# Patient Record
Sex: MALE | Race: WHITE | HISPANIC OR LATINO | Employment: FULL TIME | URBAN - METROPOLITAN AREA
[De-identification: names, ages, dates, MRNs, and addresses within clinical notes are randomized per-mention and may not be internally consistent; named-entity substitution may affect disease eponyms.]

---

## 2021-02-17 ENCOUNTER — TELEPHONE (OUTPATIENT)
Dept: SCHEDULING | Facility: IMAGING CENTER | Age: 36
End: 2021-02-17

## 2021-03-09 ENCOUNTER — PATIENT OUTREACH (OUTPATIENT)
Dept: MEDICAL GROUP | Facility: MEDICAL CENTER | Age: 36
End: 2021-03-09

## 2021-03-09 NOTE — PROGRESS NOTES
NEW PATIENT VISIT PRE-VISIT PLANNING    1.  Epic[employer] Patient is checked in Patient Demographics?Yes    2.  Immunizations were updated in Epic using Reconcile Outside Information activity? Yes         3.  Is this appointment scheduled as a Hospital Follow-Up? No    4.  Patient is due for the following Health Maintenance Topics:   Health Maintenance Due   Topic Date Due   • IMM DTaP/Tdap/Td Vaccine (1 - Tdap) Never done   • IMM INFLUENZA (1) Never done     5.  Reviewed/Updated the following with patient:       •   Preferred Pharmacy? Yes       •   Preferred Lab? Yes       •   Preferred Communication? Yes       •   Allergies? Yes       •   Medications? YES. Was Abstract Encounter opened and chart updated? YES       •   Social History? Yes       •   Family History (document living status of immediate family members and if + hx of  cancer, diabetes, hypertension, hyperlipidemia, heart attack, stroke) No    6.  Updated Care Team?       •   DME Company (gait device, O2, CPAP, etc.) N\A       •   Other Specialists (eye doctor, derm, GYN, cardiology, endo, etc): N\A    7.  AHA (Puls8) form printed for Provider? N/A         Outside information NOT reconciled using the dlo[place] feature. Per Julissa Arreguin, the dlo[place] feature is down as of 02/09/2021 at 2:00pm. Will reconcile outside information at a later date.

## 2021-03-15 ENCOUNTER — TELEPHONE (OUTPATIENT)
Dept: SCHEDULING | Facility: IMAGING CENTER | Age: 36
End: 2021-03-15

## 2021-04-05 SDOH — ECONOMIC STABILITY: HOUSING INSECURITY
IN THE LAST 12 MONTHS, WAS THERE A TIME WHEN YOU DID NOT HAVE A STEADY PLACE TO SLEEP OR SLEPT IN A SHELTER (INCLUDING NOW)?: NO

## 2021-04-05 SDOH — ECONOMIC STABILITY: HOUSING INSECURITY: IN THE LAST 12 MONTHS, HOW MANY PLACES HAVE YOU LIVED?: 2

## 2021-04-05 SDOH — ECONOMIC STABILITY: INCOME INSECURITY: IN THE LAST 12 MONTHS, WAS THERE A TIME WHEN YOU WERE NOT ABLE TO PAY THE MORTGAGE OR RENT ON TIME?: NO

## 2021-04-05 SDOH — ECONOMIC STABILITY: TRANSPORTATION INSECURITY
IN THE PAST 12 MONTHS, HAS LACK OF RELIABLE TRANSPORTATION KEPT YOU FROM MEDICAL APPOINTMENTS, MEETINGS, WORK OR FROM GETTING THINGS NEEDED FOR DAILY LIVING?: NO

## 2021-04-05 SDOH — HEALTH STABILITY: MENTAL HEALTH
STRESS IS WHEN SOMEONE FEELS TENSE, NERVOUS, ANXIOUS, OR CAN'T SLEEP AT NIGHT BECAUSE THEIR MIND IS TROUBLED. HOW STRESSED ARE YOU?: TO SOME EXTENT

## 2021-04-05 SDOH — HEALTH STABILITY: PHYSICAL HEALTH: ON AVERAGE, HOW MANY DAYS PER WEEK DO YOU ENGAGE IN MODERATE TO STRENUOUS EXERCISE (LIKE A BRISK WALK)?: 7 DAYS

## 2021-04-05 SDOH — ECONOMIC STABILITY: TRANSPORTATION INSECURITY
IN THE PAST 12 MONTHS, HAS THE LACK OF TRANSPORTATION KEPT YOU FROM MEDICAL APPOINTMENTS OR FROM GETTING MEDICATIONS?: NO

## 2021-04-05 SDOH — HEALTH STABILITY: PHYSICAL HEALTH: ON AVERAGE, HOW MANY MINUTES DO YOU ENGAGE IN EXERCISE AT THIS LEVEL?: 130 MINUTES

## 2021-04-05 ASSESSMENT — SOCIAL DETERMINANTS OF HEALTH (SDOH)
HOW MANY DRINKS CONTAINING ALCOHOL DO YOU HAVE ON A TYPICAL DAY WHEN YOU ARE DRINKING: 1 OR 2
HOW OFTEN DO YOU ATTENT MEETINGS OF THE CLUB OR ORGANIZATION YOU BELONG TO?: DECLINE
HOW OFTEN DO YOU HAVE SIX OR MORE DRINKS ON ONE OCCASION: NEVER
HOW OFTEN DO YOU GET TOGETHER WITH FRIENDS OR RELATIVES?: THREE TIMES A WEEK
HOW HARD IS IT FOR YOU TO PAY FOR THE VERY BASICS LIKE FOOD, HOUSING, MEDICAL CARE, AND HEATING?: NOT VERY HARD
WITHIN THE PAST 12 MONTHS, YOU WORRIED THAT YOUR FOOD WOULD RUN OUT BEFORE YOU GOT THE MONEY TO BUY MORE: SOMETIMES TRUE
HOW OFTEN DO YOU HAVE A DRINK CONTAINING ALCOHOL: 2-3 TIMES A WEEK
DO YOU BELONG TO ANY CLUBS OR ORGANIZATIONS SUCH AS CHURCH GROUPS UNIONS, FRATERNAL OR ATHLETIC GROUPS, OR SCHOOL GROUPS?: NO
WITHIN THE PAST 12 MONTHS, THE FOOD YOU BOUGHT JUST DIDN'T LAST AND YOU DIDN'T HAVE MONEY TO GET MORE: NEVER TRUE
HOW OFTEN DO YOU ATTEND CHURCH OR RELIGIOUS SERVICES?: DECLINE
ARE YOU MARRIED, WIDOWED, DIVORCED, SEPARATED, NEVER MARRIED, OR LIVING WITH A PARTNER?: NEVER MARRIED
IN A TYPICAL WEEK, HOW MANY TIMES DO YOU TALK ON THE PHONE WITH FAMILY, FRIENDS, OR NEIGHBORS?: MORE THAN THREE TIMES A WEEK

## 2021-04-06 ENCOUNTER — APPOINTMENT (OUTPATIENT)
Dept: MEDICAL GROUP | Facility: MEDICAL CENTER | Age: 36
End: 2021-04-06
Payer: COMMERCIAL

## 2021-04-06 PROBLEM — R06.02 SOB (SHORTNESS OF BREATH): Status: ACTIVE | Noted: 2021-04-06

## 2021-04-06 PROBLEM — G47.09 OTHER INSOMNIA: Status: ACTIVE | Noted: 2021-04-06

## 2021-04-06 PROBLEM — M25.561 CHRONIC PAIN OF RIGHT KNEE: Status: ACTIVE | Noted: 2021-04-06

## 2021-04-06 PROBLEM — R07.89 OTHER CHEST PAIN: Status: ACTIVE | Noted: 2021-04-06

## 2021-04-06 PROBLEM — R53.83 OTHER FATIGUE: Status: ACTIVE | Noted: 2021-04-06

## 2021-04-06 PROBLEM — G89.29 CHRONIC PAIN OF RIGHT KNEE: Status: ACTIVE | Noted: 2021-04-06

## 2021-04-22 ENCOUNTER — HOSPITAL ENCOUNTER (OUTPATIENT)
Dept: LAB | Facility: MEDICAL CENTER | Age: 36
End: 2021-04-22
Attending: FAMILY MEDICINE
Payer: COMMERCIAL

## 2021-04-22 DIAGNOSIS — Z13.6 ENCOUNTER FOR LIPID SCREENING FOR CARDIOVASCULAR DISEASE: ICD-10-CM

## 2021-04-22 DIAGNOSIS — R06.02 SOB (SHORTNESS OF BREATH): ICD-10-CM

## 2021-04-22 DIAGNOSIS — Z13.0 SCREENING FOR DEFICIENCY ANEMIA: ICD-10-CM

## 2021-04-22 DIAGNOSIS — R53.83 OTHER FATIGUE: ICD-10-CM

## 2021-04-22 DIAGNOSIS — Z13.220 ENCOUNTER FOR LIPID SCREENING FOR CARDIOVASCULAR DISEASE: ICD-10-CM

## 2021-04-22 DIAGNOSIS — Z13.1 SCREENING FOR DIABETES MELLITUS: ICD-10-CM

## 2021-04-22 LAB
ALBUMIN SERPL BCP-MCNC: 4.7 G/DL (ref 3.2–4.9)
ALBUMIN/GLOB SERPL: 1.5 G/DL
ALP SERPL-CCNC: 64 U/L (ref 30–99)
ALT SERPL-CCNC: 22 U/L (ref 2–50)
ANION GAP SERPL CALC-SCNC: 9 MMOL/L (ref 7–16)
AST SERPL-CCNC: 21 U/L (ref 12–45)
BILIRUB SERPL-MCNC: 0.4 MG/DL (ref 0.1–1.5)
BUN SERPL-MCNC: 19 MG/DL (ref 8–22)
CALCIUM SERPL-MCNC: 9.8 MG/DL (ref 8.5–10.5)
CHLORIDE SERPL-SCNC: 100 MMOL/L (ref 96–112)
CHOLEST SERPL-MCNC: 242 MG/DL (ref 100–199)
CO2 SERPL-SCNC: 25 MMOL/L (ref 20–33)
CREAT SERPL-MCNC: 0.85 MG/DL (ref 0.5–1.4)
D DIMER PPP IA.FEU-MCNC: 1.8 UG/ML (FEU) (ref 0–0.5)
ERYTHROCYTE [DISTWIDTH] IN BLOOD BY AUTOMATED COUNT: 42.9 FL (ref 35.9–50)
GLOBULIN SER CALC-MCNC: 3.2 G/DL (ref 1.9–3.5)
GLUCOSE SERPL-MCNC: 93 MG/DL (ref 65–99)
HCT VFR BLD AUTO: 46.9 % (ref 42–52)
HDLC SERPL-MCNC: 55 MG/DL
HGB BLD-MCNC: 15.8 G/DL (ref 14–18)
LDLC SERPL CALC-MCNC: 171 MG/DL
MCH RBC QN AUTO: 30 PG (ref 27–33)
MCHC RBC AUTO-ENTMCNC: 33.7 G/DL (ref 33.7–35.3)
MCV RBC AUTO: 89.2 FL (ref 81.4–97.8)
PLATELET # BLD AUTO: 275 K/UL (ref 164–446)
PMV BLD AUTO: 11.7 FL (ref 9–12.9)
POTASSIUM SERPL-SCNC: 4.3 MMOL/L (ref 3.6–5.5)
PROT SERPL-MCNC: 7.9 G/DL (ref 6–8.2)
RBC # BLD AUTO: 5.26 M/UL (ref 4.7–6.1)
SODIUM SERPL-SCNC: 134 MMOL/L (ref 135–145)
TRIGL SERPL-MCNC: 80 MG/DL (ref 0–149)
TSH SERPL DL<=0.005 MIU/L-ACNC: 1.37 UIU/ML (ref 0.38–5.33)
WBC # BLD AUTO: 5.6 K/UL (ref 4.8–10.8)

## 2021-04-22 PROCEDURE — 80061 LIPID PANEL: CPT

## 2021-04-22 PROCEDURE — 85379 FIBRIN DEGRADATION QUANT: CPT

## 2021-04-22 PROCEDURE — 82306 VITAMIN D 25 HYDROXY: CPT

## 2021-04-22 PROCEDURE — 84443 ASSAY THYROID STIM HORMONE: CPT

## 2021-04-22 PROCEDURE — 36415 COLL VENOUS BLD VENIPUNCTURE: CPT

## 2021-04-22 PROCEDURE — 85027 COMPLETE CBC AUTOMATED: CPT

## 2021-04-22 PROCEDURE — 80053 COMPREHEN METABOLIC PANEL: CPT

## 2021-04-23 DIAGNOSIS — R06.02 SHORTNESS OF BREATH: ICD-10-CM

## 2021-04-23 NOTE — PROGRESS NOTES
With elevated D-dimer reports of shortness of breath since getting Covid a few months ago.  We will go ahead and order a CTA to rule out possible PE.

## 2021-04-24 LAB — 25(OH)D3 SERPL-MCNC: 44 NG/ML (ref 30–80)

## 2021-04-27 DIAGNOSIS — R06.02 SOB (SHORTNESS OF BREATH): ICD-10-CM

## 2021-04-27 NOTE — PROGRESS NOTES
Patient reports concern for lung damage secondary to his Covid infection.  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

## 2021-04-28 ENCOUNTER — TELEMEDICINE (OUTPATIENT)
Dept: MEDICAL GROUP | Facility: MEDICAL CENTER | Age: 36
End: 2021-04-28
Payer: COMMERCIAL

## 2021-04-28 VITALS — WEIGHT: 185 LBS | BODY MASS INDEX: 23.74 KG/M2 | HEIGHT: 74 IN

## 2021-04-28 DIAGNOSIS — E78.2 MIXED HYPERLIPIDEMIA: ICD-10-CM

## 2021-04-28 DIAGNOSIS — F90.9 ATTENTION DEFICIT HYPERACTIVITY DISORDER (ADHD), UNSPECIFIED ADHD TYPE: ICD-10-CM

## 2021-04-28 DIAGNOSIS — R06.02 SOB (SHORTNESS OF BREATH): ICD-10-CM

## 2021-04-28 PROCEDURE — 99214 OFFICE O/P EST MOD 30 MIN: CPT | Mod: 95,CR | Performed by: FAMILY MEDICINE

## 2021-04-28 RX ORDER — ALBUTEROL SULFATE 90 UG/1
1-2 AEROSOL, METERED RESPIRATORY (INHALATION) EVERY 4 HOURS PRN
Qty: 8 G | Refills: 3 | Status: SHIPPED | OUTPATIENT
Start: 2021-04-28 | End: 2022-10-13

## 2021-04-28 ASSESSMENT — ENCOUNTER SYMPTOMS
BRUISES/BLEEDS EASILY: 0
DIZZINESS: 0
BLURRED VISION: 0
FEVER: 0
PALPITATIONS: 0
DIARRHEA: 0
DEPRESSION: 0
CONSTIPATION: 0
MYALGIAS: 0
NAUSEA: 0
WEIGHT LOSS: 0
DOUBLE VISION: 0
SHORTNESS OF BREATH: 1
COUGH: 0
WEAKNESS: 0
CHILLS: 0

## 2021-04-28 ASSESSMENT — FIBROSIS 4 INDEX: FIB4 SCORE: 0.54

## 2021-04-28 NOTE — ASSESSMENT & PLAN NOTE
He had recent blood work completed which showed elevation in both his total cholesterol and his LDL.  His wife is a nutritionist and states that she has been making sure he eats pretty healthy with primarily lean meats, fish, leafy green vegetables, fruit.  He has also been maintaining his strength building workouts, difficulty with cardiovascular workouts.
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
## 2021-04-28 NOTE — ASSESSMENT & PLAN NOTE
Patient reports that he was diagnosed with ADHD sometime ago and was placed on Adderall 10 mg.    Reports that he uses only when he is in school to help him focus during class days.    Would like to be restarted on this medication as he has felt increasing difficulty with concentration since being diagnosed with Covid back in November 2020.

## 2021-05-04 ENCOUNTER — HOSPITAL ENCOUNTER (OUTPATIENT)
Dept: PULMONOLOGY | Facility: MEDICAL CENTER | Age: 36
End: 2021-05-04
Attending: FAMILY MEDICINE
Payer: COMMERCIAL

## 2021-05-04 ENCOUNTER — HOSPITAL ENCOUNTER (OUTPATIENT)
Dept: RADIOLOGY | Facility: MEDICAL CENTER | Age: 36
End: 2021-05-04
Attending: FAMILY MEDICINE
Payer: COMMERCIAL

## 2021-05-04 DIAGNOSIS — R06.02 SHORTNESS OF BREATH: ICD-10-CM

## 2021-05-04 DIAGNOSIS — R06.02 SOB (SHORTNESS OF BREATH): ICD-10-CM

## 2021-05-04 PROCEDURE — 94726 PLETHYSMOGRAPHY LUNG VOLUMES: CPT | Mod: 26 | Performed by: INTERNAL MEDICINE

## 2021-05-04 PROCEDURE — 700117 HCHG RX CONTRAST REV CODE 255: Performed by: FAMILY MEDICINE

## 2021-05-04 PROCEDURE — 94729 DIFFUSING CAPACITY: CPT

## 2021-05-04 PROCEDURE — 94729 DIFFUSING CAPACITY: CPT | Mod: 26 | Performed by: INTERNAL MEDICINE

## 2021-05-04 PROCEDURE — 94060 EVALUATION OF WHEEZING: CPT

## 2021-05-04 PROCEDURE — 94726 PLETHYSMOGRAPHY LUNG VOLUMES: CPT

## 2021-05-04 PROCEDURE — 94060 EVALUATION OF WHEEZING: CPT | Mod: 26 | Performed by: INTERNAL MEDICINE

## 2021-05-04 PROCEDURE — 71275 CT ANGIOGRAPHY CHEST: CPT

## 2021-05-04 RX ORDER — ALBUTEROL SULFATE 90 UG/1
2 AEROSOL, METERED RESPIRATORY (INHALATION)
Status: DISCONTINUED | OUTPATIENT
Start: 2021-05-04 | End: 2021-05-05 | Stop reason: HOSPADM

## 2021-05-04 RX ADMIN — IOHEXOL 80 ML: 350 INJECTION, SOLUTION INTRAVENOUS at 13:45

## 2021-05-04 ASSESSMENT — PULMONARY FUNCTION TESTS
FEV1/FVC: 93.67
FVC_PERCENT_PREDICTED: 104
FVC_PREDICTED: 6.16
FVC: 6.43
FEV1: 5.38
FEV1/FVC: 84
FEV1/FVC_PERCENT_LLN: 68.01
FEV1/FVC_PERCENT_PREDICTED: 102
FEV1_PERCENT_PREDICTED: 107
FVC_LLN: 5.15
FEV1/FVC_PERCENT_PREDICTED: 81
FEV1_LLN: 4.17
FEV1/FVC_PERCENT_PREDICTED: 103
FEV1/FVC_PREDICTED: 81.44
FEV1_PREDICTED: 4.99

## 2021-05-05 NOTE — PROCEDURES
DATE OF SERVICE:  05/04/2021     PULMONARY FUNCTION TEST INTERPRETATION     REFERRING PROVIDER:  ERIC Hickey     INTERPRETING PROVIDER:  Carl Chairez III, MD     RESULTS:  SPIROMETRY:  FVC prebronchodilator 6.43 liters, 104% predicted.  FEV1 prebronchodilator 5.38 liters, 107% predicted.  FEV1/FVC 83%.  Maximum voluntary ventilation 97 liters per minute, 51% predicted.     LUNG VOLUMES:  Residual volume 2.65 liters, 139% predicted.  Total lung capacity 8.87 liters, 114% predicted.     DIFFUSION CAPACITY:  DLCO uncorrected for hemoglobin 116% predicted.  DL/% predicted.     INTERPRETATION:  1.  There is no significant obstruction seen on spirometry.  Bronchodilator   testing was not performed as the patient declined reporting symptoms of nausea   and feeling like vomiting.  2.  The measured maximum voluntary ventilation is reduced disproportionately   to the degree of FEV1.  This may reflect suboptimal effort, neuromuscular   disease, or upper airway obstruction.  Consider maximal inspiratory pressure   and maximal expiratory pressure measurements if clinically appropriate.  3.  Lung volumes are normal.  4.  Diffusion capacity is normal.  5.  Flow volume loop is consistent with the above interpretation.  6.  No prior PFTs for comparison at the time of interpretation.        ______________________________  MD WADE Burnham III/QIAN/AKSHAT    DD:  05/04/2021 15:25  DT:  05/04/2021 16:03    Job#:  [id number]

## 2021-05-11 ENCOUNTER — TELEMEDICINE (OUTPATIENT)
Dept: MEDICAL GROUP | Facility: MEDICAL CENTER | Age: 36
End: 2021-05-11
Payer: COMMERCIAL

## 2021-05-11 DIAGNOSIS — M79.672 LEFT FOOT PAIN: ICD-10-CM

## 2021-05-11 DIAGNOSIS — R06.02 SOB (SHORTNESS OF BREATH): ICD-10-CM

## 2021-05-11 PROBLEM — M79.671 RIGHT FOOT PAIN: Status: ACTIVE | Noted: 2021-05-11

## 2021-05-11 PROCEDURE — 99213 OFFICE O/P EST LOW 20 MIN: CPT | Mod: 95 | Performed by: FAMILY MEDICINE

## 2021-05-11 ASSESSMENT — ENCOUNTER SYMPTOMS
SHORTNESS OF BREATH: 0
DOUBLE VISION: 0
DIARRHEA: 0
COUGH: 0
WEAKNESS: 0
PALPITATIONS: 0
NAUSEA: 0
CONSTIPATION: 0
BLURRED VISION: 0
MYALGIAS: 0
CHILLS: 0
DIZZINESS: 0
FEVER: 0
BRUISES/BLEEDS EASILY: 0
DEPRESSION: 0
WEIGHT LOSS: 0

## 2021-05-11 NOTE — ASSESSMENT & PLAN NOTE
Patient reports continued shortness of breath and since being in [place] for the last month he feels as though potentially it is getting worse.  He does report that he was recently in [place] and felt that it may have been getting mildly better.  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  He did recently complete a pulmonary function test and found it very difficult for the prolonged expiration.  He was unable to use the albuterol inhaler during the test as he felt very nauseated and sick to his stomach.  Patient is curious if potentially his shortness of breath may be from cardiac etiology.

## 2021-05-11 NOTE — PROGRESS NOTES
Telemedicine Visit: Established Patient     This encounter was conducted via Zoom.   Verbal consent was obtained. Patient's identity was verified.    Subjective:   CC: Imaging referral  Izaiah Marin is a 35 y.o. male presenting for evaluation and management of:    Left foot pain  Patient reports that recently he has been noticing some pain to the bottom of his left foot when his great toe is flexed upward.  Patient does report that he runs quite frequently to stay fit despite his shortness of breath.  Patient denies the ability to elicit pain.  He describes pain as burning in nature and that the pain is only be elicited when the toe is flexed upward.    Pain to joint on the foot.  Does a of running and pain with great toe flexion.      SOB (shortness of breath)  Patient reports continued shortness of breath and since being in [place] for the last month he feels as though potentially it is getting worse.  He does report that he was recently in [place] and felt that it may have been getting mildly better.  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  He did recently complete a pulmonary function test and found it very difficult for the prolonged expiration.  He was unable to use the albuterol inhaler during the test as he felt very nauseated and sick to his stomach.  Patient is curious if potentially his shortness of breath may be from cardiac etiology.    Review of Systems   Constitutional: Negative for chills, fever and weight loss.   HENT: Negative for hearing loss.    Eyes: Negative for blurred vision and double vision.   Respiratory: Negative for cough and shortness of breath.    Cardiovascular: Negative for chest pain, palpitations and leg swelling.   Gastrointestinal: Negative for constipation, diarrhea and nausea.   Genitourinary: Negative.    Musculoskeletal: Negative  for myalgias.   Skin: Negative for rash.   Neurological: Negative for dizziness and weakness.   Endo/Heme/Allergies: Does not bruise/bleed easily.   Psychiatric/Behavioral: Negative for depression.        No Known Allergies    Current medicines (including changes today)  Current Outpatient Medications   Medication Sig Dispense Refill   • albuterol 108 (90 Base) MCG/ACT Aero Soln inhalation aerosol Inhale 1-2 Puffs every four hours as needed for Shortness of Breath. 8 g 3   • VITAMIN D PO Take  by mouth.     • Omega-3 Fatty Acids (FISH OIL) 1000 MG Cap capsule Take 1,000 mg by mouth 3 times a day with meals.     • amphetamine-dextroamphetamine (ADDERALL) 10 MG TABS 1 tab daily as needed 30 Tab 0     No current facility-administered medications for this visit.       Patient Active Problem List    Diagnosis Date Noted   • Left foot pain 05/11/2021   • Mixed hyperlipidemia 04/28/2021   • SOB (shortness of breath) 04/06/2021   • Other fatigue 04/06/2021   • Other insomnia 04/06/2021   • Other chest pain 04/06/2021   • Chronic pain of right knee 04/06/2021   • Irritable bowel syndrome 11/28/2016   • ADHD 07/13/2016   • Diarrhea 12/02/2015       Family History   Problem Relation Age of Onset   • Thyroid Mother    • Alzheimer's Disease Father    • ADD / ADHD Father    • Asthma Brother    • Alzheimer's Disease Maternal Grandmother    • Arthritis Maternal Grandfather    • Alzheimer's Disease Paternal Grandmother    • No Known Problems Paternal Grandfather        He  has a past medical history of ADD (attention deficit disorder) and Clostridium difficile infection.  He  has a past surgical history that includes irrigation & debridement ortho (Right, 9/29/2015).       Objective:   There were no vitals taken for this visit.    Physical Exam:  Constitutional: Alert, no distress, well-groomed.  Skin: No rashes in visible areas.  Eye: Round. Conjunctiva clear, lids normal. No icterus.   ENMT: Lips pink without lesions, good  dentition, moist mucous membranes. Phonation normal.  Neck: No masses, no thyromegaly. Moves freely without pain.  CV: Pulse as reported by patient  Respiratory: Unlabored respiratory effort, no cough or audible wheeze  Psych: Alert and oriented x3, normal affect and mood.     Labs:  No recent labs to review    Imaging:  CT-CTA 05/04/2021:   IMPRESSION:     1.  No evidence of pulmonary embolus.     PFT 05/04/2021:  INTERPRETATION:  1.  There is no significant obstruction seen on spirometry.  Bronchodilator   testing was not performed as the patient declined reporting symptoms of nausea   and feeling like vomiting.  2.  The measured maximum voluntary ventilation is reduced disproportionately   to the degree of FEV1.  This may reflect suboptimal effort, neuromuscular   disease, or upper airway obstruction.  Consider maximal inspiratory pressure   and maximal expiratory pressure measurements if clinically appropriate.  3.  Lung volumes are normal.  4.  Diffusion capacity is normal.  5.  Flow volume loop is consistent with the above interpretation.  6.  No prior PFTs for comparison at the time of interpretation.    Assessment and Plan:   The following treatment plan was discussed:     1. SOB (shortness of breath)  Chronic, unchanged.  Referrals been placed to pulmonary medicine due to patient's unknown etiology for shortness of breath.  Patient is unable to come into the office to rule out potential cardiac etiology, though my suspicion for this is low.  We did discuss examining potential BNP.  I advised that the patient follow-up with a pulmonary provider for further work-up of the shortness of breath.   - REFERRAL TO PULMONARY AND SLEEP MEDICINE  - proBrain Natriuretic Peptide, NT; Future    2. Left foot pain  With patient's description of his foot pain I suspect potential plantar fasciitis and advised that he take 600 mg ibuprofen as needed for pain and inflammation.     Follow-up: I advised follow-up after he is  seen by pulmonary medicine or if his symptoms worsen.      Advised to set up an appointment with me sooner if symptoms continue to worsen and/or do not improve.  Discussed with the patient on when to seek out treatment at the emergency department.

## 2021-05-11 NOTE — ASSESSMENT & PLAN NOTE
Patient reports that recently he has been noticing some pain to the bottom of his left foot when his great toe is flexed upward.  Patient does report that he runs quite frequently to stay fit despite his shortness of breath.  Patient denies the ability to elicit pain.  He describes pain as burning in nature and that the pain is only be elicited when the toe is flexed upward.    Pain to joint on the foot.  Does a of running and pain with great toe flexion.

## 2021-05-14 DIAGNOSIS — R06.02 SOB (SHORTNESS OF BREATH): ICD-10-CM

## 2021-05-14 NOTE — PROGRESS NOTES
Patient requesting to have cardiac work-up due to unexplained dyspnea.  I have placed an order for a troponin along with an echocardiogram and a cardiac stress test.

## 2021-05-16 ENCOUNTER — HOSPITAL ENCOUNTER (OUTPATIENT)
Facility: MEDICAL CENTER | Age: 36
End: 2021-05-16
Attending: NURSE PRACTITIONER
Payer: COMMERCIAL

## 2021-05-16 ENCOUNTER — OFFICE VISIT (OUTPATIENT)
Dept: URGENT CARE | Facility: CLINIC | Age: 36
End: 2021-05-16
Payer: COMMERCIAL

## 2021-05-16 VITALS
OXYGEN SATURATION: 97 % | RESPIRATION RATE: 16 BRPM | DIASTOLIC BLOOD PRESSURE: 70 MMHG | SYSTOLIC BLOOD PRESSURE: 134 MMHG | HEART RATE: 66 BPM | TEMPERATURE: 98.6 F

## 2021-05-16 DIAGNOSIS — J02.9 PHARYNGITIS, UNSPECIFIED ETIOLOGY: ICD-10-CM

## 2021-05-16 DIAGNOSIS — Z20.822 SUSPECTED COVID-19 VIRUS INFECTION: ICD-10-CM

## 2021-05-16 LAB
COVID ORDER STATUS COVID19: NORMAL
INT CON NEG: NORMAL
INT CON POS: NORMAL
S PYO AG THROAT QL: NEGATIVE

## 2021-05-16 PROCEDURE — 99000 SPECIMEN HANDLING OFFICE-LAB: CPT | Performed by: NURSE PRACTITIONER

## 2021-05-16 PROCEDURE — U0005 INFEC AGEN DETEC AMPLI PROBE: HCPCS

## 2021-05-16 PROCEDURE — 87880 STREP A ASSAY W/OPTIC: CPT | Performed by: NURSE PRACTITIONER

## 2021-05-16 PROCEDURE — 99213 OFFICE O/P EST LOW 20 MIN: CPT | Mod: CS | Performed by: NURSE PRACTITIONER

## 2021-05-16 PROCEDURE — U0003 INFECTIOUS AGENT DETECTION BY NUCLEIC ACID (DNA OR RNA); SEVERE ACUTE RESPIRATORY SYNDROME CORONAVIRUS 2 (SARS-COV-2) (CORONAVIRUS DISEASE [COVID-19]), AMPLIFIED PROBE TECHNIQUE, MAKING USE OF HIGH THROUGHPUT TECHNOLOGIES AS DESCRIBED BY CMS-2020-01-R: HCPCS

## 2021-05-16 ASSESSMENT — ENCOUNTER SYMPTOMS
SHORTNESS OF BREATH: 1
VOMITING: 0
FEVER: 0
CHILLS: 0
HEMOPTYSIS: 0
FATIGUE: 1
SINUS PAIN: 1
COUGH: 1
NAUSEA: 1
DIARRHEA: 0
ABDOMINAL PAIN: 1
SPUTUM PRODUCTION: 1
WHEEZING: 0
MYALGIAS: 0
SORE THROAT: 1
HEADACHES: 1

## 2021-05-16 NOTE — PROGRESS NOTES
Subjective:     Izaiah Marin is a 35 y.o. male who presents for Sore Throat (x2days, nasuea, fatigue, runny nose, congestion, slight cough)      Influenza  This is a new problem. The current episode started in the past 7 days (Two days ago Izaiah developed a sore throat, abdominal pain, congestion, fatigue and productive cough). The problem occurs constantly. The problem has been gradually worsening. Associated symptoms include abdominal pain, congestion, coughing, fatigue, headaches, nausea and a sore throat. Pertinent negatives include no chills, fever, myalgias, rash or vomiting. Nothing aggravates the symptoms. He has tried nothing for the symptoms.         Review of Systems   Constitutional: Positive for fatigue and malaise/fatigue. Negative for chills and fever.   HENT: Positive for congestion, ear pain, sinus pain and sore throat.    Respiratory: Positive for cough, sputum production and shortness of breath. Negative for hemoptysis and wheezing.    Gastrointestinal: Positive for abdominal pain and nausea. Negative for diarrhea and vomiting.   Musculoskeletal: Negative for myalgias.   Skin: Negative for rash.   Neurological: Positive for headaches.       PMH:   Past Medical History:   Diagnosis Date   • ADD (attention deficit disorder)    • Hx of Clostridium difficile infection      ALLERGIES: No Known Allergies  SURGHX:   Past Surgical History:   Procedure Laterality Date   • IRRIGATION & DEBRIDEMENT ORTHO Right 9/29/2015    Procedure: I & D RIGHT INDEX FINGER;  Surgeon: Neal Nolen M.D.;  Location: SURGERY [facility];  Service:      SOCHX:   Social History     Socioeconomic History   • Marital status: Single     Spouse name: Not on file   • Number of children: Not on file   • Years of education: Not on file   • Highest education level: Associate degree: occupational, technical, or vocational program   Occupational History   • Not on file   Tobacco Use   • Smoking status: Never Smoker   • Smokeless  tobacco: Never Used   Vaping Use   • Vaping Use: Never used   Substance and Sexual Activity   • Alcohol use: Yes     Alcohol/week: 2.4 oz     Types: 4 Glasses of wine per week   • Drug use: No   • Sexual activity: Yes     Partners: Female   Other Topics Concern   • Not on file   Social History Narrative   • Not on file     Social Determinants of Health     Financial Resource Strain: Low Risk    • Difficulty of Paying Living Expenses: Not very hard   Food Insecurity: Food Insecurity Present   • Worried About Running Out of Food in the Last Year: Sometimes true   • Ran Out of Food in the Last Year: Never true   Transportation Needs: No Transportation Needs   • Lack of Transportation (Medical): No   • Lack of Transportation (Non-Medical): No   Physical Activity: Sufficiently Active   • Days of Exercise per Week: 7 days   • Minutes of Exercise per Session: 130 min   Stress: Stress Concern Present   • Feeling of Stress : To some extent   Social Connections: Unknown   • Frequency of Communication with Friends and Family: More than three times a week   • Frequency of Social Gatherings with Friends and Family: Three times a week   • Attends Cheondoism Services: Patient refused   • Active Member of Clubs or Organizations: No   • Attends Club or Organization Meetings: Patient refused   • Marital Status: Never    Intimate Partner Violence:    • Fear of Current or Ex-Partner:    • Emotionally Abused:    • Physically Abused:    • Sexually Abused:      FH:   Family History   Problem Relation Age of Onset   • Thyroid Mother    • Alzheimer's Disease Father    • ADD / ADHD Father    • Asthma Brother    • Alzheimer's Disease Maternal Grandmother    • Arthritis Maternal Grandfather    • Alzheimer's Disease Paternal Grandmother    • No Known Problems Paternal Grandfather          Objective:   /70 (BP Location: Left arm, Patient Position: Sitting, BP Cuff Size: Adult)   Pulse 66   Temp 37 °C (98.6 °F) (Temporal)   Resp 16    SpO2 97%     Physical Exam  Vitals and nursing note reviewed.   Constitutional:       General: He is not in acute distress.     Appearance: Normal appearance. He is normal weight. He is ill-appearing. He is not toxic-appearing.   HENT:      Head: Normocephalic and atraumatic.      Right Ear: Tympanic membrane, ear canal and external ear normal. There is no impacted cerumen.      Left Ear: Tympanic membrane, ear canal and external ear normal. There is no impacted cerumen.      Nose: No congestion or rhinorrhea.      Mouth/Throat:      Mouth: Mucous membranes are moist.      Pharynx: Posterior oropharyngeal erythema present. No oropharyngeal exudate.   Eyes:      Extraocular Movements: Extraocular movements intact.      Pupils: Pupils are equal, round, and reactive to light.   Cardiovascular:      Rate and Rhythm: Normal rate and regular rhythm.      Pulses: Normal pulses.      Heart sounds: Normal heart sounds.   Pulmonary:      Effort: Pulmonary effort is normal. No respiratory distress.      Breath sounds: Normal breath sounds. No stridor. No wheezing, rhonchi or rales.   Chest:      Chest wall: No tenderness.   Abdominal:      General: Abdomen is flat. Bowel sounds are normal.      Palpations: Abdomen is soft.      Tenderness: There is no abdominal tenderness. There is no right CVA tenderness or left CVA tenderness.   Musculoskeletal:         General: Normal range of motion.      Cervical back: Normal range of motion and neck supple. No tenderness.   Lymphadenopathy:      Cervical: Cervical adenopathy present.   Skin:     General: Skin is warm and dry.      Capillary Refill: Capillary refill takes less than 2 seconds.   Neurological:      General: No focal deficit present.      Mental Status: He is alert and oriented to person, place, and time. Mental status is at baseline.   Psychiatric:         Mood and Affect: Mood normal.         Behavior: Behavior normal.         Thought Content: Thought content normal.          Judgment: Judgment normal.       poct strep: Negative a  Assessment/Plan:   Assessment    1. Suspected COVID-19 virus infection  COVID/SARS CoV-2 PCR   2. Pharyngitis, unspecified etiology  POCT Rapid Strep A     We discussed differential diagnoses.  It is possible that his symptoms are related to allergies as he has been transferring back and forth between [place], [place] and [place], [place].  He was tested for COVID-19 today.I will call with results. Upon entering the room PPE was worn throughout the duration of his visit for both provider and patient. Mask of patient briefly removed for examination of oropharynx. PT was educated to remain in self isolation for at least 10 days from onset of symptoms followed by an additional 24-hour period of being symptom-free without the use of symptom altering medication.  We discussed supportive measures including humidifier, warm salt water gargles, over-the-counter Cepacol throat lozenges, rest  and increased fluids. Pt was encouraged to seek treatment back in the ER or urgent care for worsening symptoms,  fever greater than 100.5, wheezes or shortness of breath.      AVS handout given and reviewed with patient. Pt educated on red flags and when to seek treatment back in ER or UC.

## 2021-05-17 LAB
SARS-COV-2 RNA RESP QL NAA+PROBE: NOTDETECTED
SPECIMEN SOURCE: NORMAL

## 2021-05-18 ENCOUNTER — TELEPHONE (OUTPATIENT)
Dept: URGENT CARE | Facility: CLINIC | Age: 36
End: 2021-05-18

## 2021-05-21 ENCOUNTER — HOSPITAL ENCOUNTER (OUTPATIENT)
Dept: LAB | Facility: MEDICAL CENTER | Age: 36
End: 2021-05-21
Attending: FAMILY MEDICINE
Payer: COMMERCIAL

## 2021-05-21 DIAGNOSIS — R06.02 SOB (SHORTNESS OF BREATH): ICD-10-CM

## 2021-05-21 LAB
NT-PROBNP SERPL IA-MCNC: 61 PG/ML (ref 0–125)
TROPONIN T SERPL-MCNC: <6 NG/L (ref 6–19)

## 2021-05-21 PROCEDURE — 36415 COLL VENOUS BLD VENIPUNCTURE: CPT

## 2021-05-21 PROCEDURE — 83880 ASSAY OF NATRIURETIC PEPTIDE: CPT

## 2021-05-21 PROCEDURE — 84484 ASSAY OF TROPONIN QUANT: CPT

## 2021-05-28 ENCOUNTER — TELEPHONE (OUTPATIENT)
Dept: MEDICAL GROUP | Facility: MEDICAL CENTER | Age: 36
End: 2021-05-28

## 2021-05-28 NOTE — TELEPHONE ENCOUNTER
Please call let the patient know that I went ahead and sent him his work excuse note.  If he needs more than that please let me know    COLBY Garcia

## 2021-05-28 NOTE — TELEPHONE ENCOUNTER
Izaiah goyal called and is needing a letter stating he cant  Work until he comes for a visit on June 4th 2021

## 2021-05-28 NOTE — LETTER
May 28, 2021        Izaiah Marin        To whom it may concern,    Please excuse Izaiah from work until after his in person visit with me on June 4th.      Sincerely,        LYLE Garcia.

## 2021-06-04 ENCOUNTER — OFFICE VISIT (OUTPATIENT)
Dept: MEDICAL GROUP | Facility: MEDICAL CENTER | Age: 36
End: 2021-06-04
Payer: COMMERCIAL

## 2021-06-04 ENCOUNTER — HOSPITAL ENCOUNTER (OUTPATIENT)
Dept: RADIOLOGY | Facility: MEDICAL CENTER | Age: 36
End: 2021-06-04
Attending: FAMILY MEDICINE
Payer: COMMERCIAL

## 2021-06-04 VITALS
DIASTOLIC BLOOD PRESSURE: 74 MMHG | OXYGEN SATURATION: 96 % | BODY MASS INDEX: 26.87 KG/M2 | TEMPERATURE: 98.4 F | SYSTOLIC BLOOD PRESSURE: 128 MMHG | WEIGHT: 198.41 LBS | HEIGHT: 72 IN | RESPIRATION RATE: 16 BRPM | HEART RATE: 86 BPM

## 2021-06-04 DIAGNOSIS — R06.02 SOB (SHORTNESS OF BREATH): ICD-10-CM

## 2021-06-04 DIAGNOSIS — R07.89 OTHER CHEST PAIN: ICD-10-CM

## 2021-06-04 DIAGNOSIS — G89.29 CHRONIC PAIN OF RIGHT KNEE: ICD-10-CM

## 2021-06-04 DIAGNOSIS — M79.672 LEFT FOOT PAIN: ICD-10-CM

## 2021-06-04 DIAGNOSIS — M25.561 CHRONIC PAIN OF RIGHT KNEE: ICD-10-CM

## 2021-06-04 PROCEDURE — 99212 OFFICE O/P EST SF 10 MIN: CPT | Performed by: FAMILY MEDICINE

## 2021-06-04 PROCEDURE — 78452 HT MUSCLE IMAGE SPECT MULT: CPT | Mod: 26 | Performed by: INTERNAL MEDICINE

## 2021-06-04 PROCEDURE — 93018 CV STRESS TEST I&R ONLY: CPT | Performed by: INTERNAL MEDICINE

## 2021-06-04 PROCEDURE — A9502 TC99M TETROFOSMIN: HCPCS

## 2021-06-04 ASSESSMENT — ENCOUNTER SYMPTOMS
BLURRED VISION: 0
CONSTIPATION: 0
PALPITATIONS: 0
MYALGIAS: 0
FEVER: 0
COUGH: 1
DOUBLE VISION: 0
DEPRESSION: 0
WEAKNESS: 0
DIARRHEA: 0
BRUISES/BLEEDS EASILY: 0
CHILLS: 0
WEIGHT LOSS: 0
NAUSEA: 0
DIZZINESS: 0
SHORTNESS OF BREATH: 1

## 2021-06-04 ASSESSMENT — FIBROSIS 4 INDEX: FIB4 SCORE: 0.55

## 2021-06-04 NOTE — LETTER
June 4, 2021    To Whom It May Concern:         This is confirmation that Izaiah Marin attended his scheduled appointment with COLBY Garcia on 6/04/21.  I have cleared him for work without limitations.         If you have any questions please do not hesitate to call me at the phone number listed below.    Sincerely,          LYLE Garcia.  [phone]

## 2021-06-04 NOTE — ASSESSMENT & PLAN NOTE
Patient reports that he was walking around at [facility] and started to feel increased amount of pain to the dorsal aspect of his great toe when he flexes his toe upward.  At that time he was wearing more of a soft soled shoe.  He states that while he is at work wearing his boots he does not get this pain.   He plans on following up with sports medicine for further evaluation of his foot and knee pain.

## 2021-06-04 NOTE — ASSESSMENT & PLAN NOTE
Patient reports that in the beginning of May he started to experience some midsternal chest discomfort along with shortness of breath.  The next day he started to then experience more nasal congestion, runny nose, postnasal drip, sore throat, cough.  He was seen at urgent care on 05/16/2021 and tested for Covid.  Both his Covid and rapid strep were negative.  He then started to feel somewhat better followed by worsening of his symptoms.  He states that over the next week his symptoms began to improve overall.  He currently denies any chest pain, shortness of breath, runny nose, nasal congestion.    He does report a mild lingering cough, throat clearing.

## 2021-06-04 NOTE — PATIENT INSTRUCTIONS
Pulmonary Medicine     [facility]  [address] [place] [address]  Phone: [phone]     Patient Care Coordination notes: ready to schedule

## 2021-06-04 NOTE — PROCEDURES
Nuclear treadmill cardiac stress test performed via Jeremiah protocol without complication. Baseline EKG shows NSR with no significant changes or findings throughout exam. Patient denies cardiac symptoms. VSS. Patient tolerated well.

## 2021-06-04 NOTE — ASSESSMENT & PLAN NOTE
Patient reports that his shortness of breath has been somewhat improving and is not as bad as it was several months ago. He would still like to be followed up with pulmonary medicine as he reports his wife was concerned about him not breathing in the middle the night.

## 2021-06-04 NOTE — PROGRESS NOTES
Izaiah Marin is a pleasant 36 y.o. male here for   Chief Complaint   Patient presents with   • Follow-Up     Cardiology      HPI:  Other chest pain  Patient reports that in the beginning of May he started to experience some midsternal chest discomfort along with shortness of breath.  The next day he started to then experience more nasal congestion, runny nose, postnasal drip, sore throat, cough.  He was seen at urgent care on 05/16/2021 and tested for Covid.  Both his Covid and rapid strep were negative.  He then started to feel somewhat better followed by worsening of his symptoms.  He states that over the next week his symptoms began to improve overall.  He currently denies any chest pain, shortness of breath, runny nose, nasal congestion.    He does report a mild lingering cough, throat clearing.    SOB (shortness of breath)  Patient reports that his shortness of breath has been somewhat improving and is not as bad as it was several months ago. He would still like to be followed up with pulmonary medicine as he reports his wife was concerned about him not breathing in the middle the night.    Left foot pain  Patient reports that he was walking around at [facility] and started to feel increased amount of pain to the dorsal aspect of his great toe when he flexes his toe upward.  At that time he was wearing more of a soft soled shoe.  He states that while he is at work wearing his boots he does not get this pain.   He plans on following up with sports medicine for further evaluation of his foot and knee pain.      Health Maintenance  Patient is due for his Tdap vaccine Tdap is unavailable in the clinic, patient states he will go to a pharmacy to get this done.    Current Medicines (including changes today)  Current Outpatient Medications   Medication Sig Dispense Refill   • albuterol 108 (90 Base) MCG/ACT Aero Soln inhalation aerosol Inhale 1-2 Puffs every four hours as needed for Shortness of Breath. 8 g 3   •  "VITAMIN D PO Take  by mouth.     • Omega-3 Fatty Acids (FISH OIL) 1000 MG Cap capsule Take 1,000 mg by mouth 3 times a day with meals.     • amphetamine-dextroamphetamine (ADDERALL) 10 MG TABS 1 tab daily as needed (Patient not taking: Reported on 5/16/2021) 30 Tab 0     No current facility-administered medications for this visit.     Past Medical/ Surgical History  He  has a past medical history of ADD (attention deficit disorder) and Clostridium difficile infection.  He  has a past surgical history that includes irrigation & debridement ortho (Right, 9/29/2015).    Review of Systems   Constitutional: Negative for chills, fever and weight loss.   HENT: Negative for hearing loss.    Eyes: Negative for blurred vision and double vision.   Respiratory: Positive for cough (Dry) and shortness of breath (Improving).    Cardiovascular: Negative for chest pain, palpitations and leg swelling.   Gastrointestinal: Negative for constipation, diarrhea and nausea.   Genitourinary: Negative.    Musculoskeletal: Negative for myalgias.   Skin: Negative for rash.   Neurological: Negative for dizziness and weakness.   Endo/Heme/Allergies: Does not bruise/bleed easily.   Psychiatric/Behavioral: Negative for depression.         Objective:     /74 (BP Location: Left arm, Patient Position: Sitting, BP Cuff Size: Adult)   Pulse 86   Temp 36.9 °C (98.4 °F) (Temporal)   Resp 16   Ht 1.84 m (6' 0.44\")   Wt 90 kg (198 lb 6.6 oz)   SpO2 96%  Body mass index is 26.58 kg/m².    Physical Exam  Constitutional:       General: He is not in acute distress.  HENT:      Head: Normocephalic and atraumatic.      Right Ear: External ear normal.      Left Ear: External ear normal.   Eyes:      Conjunctiva/sclera: Conjunctivae normal.      Pupils: Pupils are equal, round, and reactive to light.   Cardiovascular:      Rate and Rhythm: Normal rate and regular rhythm.      Heart sounds: No murmur heard.   No friction rub. No gallop.    Pulmonary:    "   Effort: Pulmonary effort is normal.      Breath sounds: Normal breath sounds. No wheezing or rales.   Abdominal:      General: Bowel sounds are normal.      Palpations: Abdomen is soft.      Tenderness: There is no abdominal tenderness.   Musculoskeletal:      Cervical back: Normal range of motion and neck supple.   Skin:     General: Skin is warm and dry.      Findings: No rash.   Neurological:      Mental Status: He is alert and oriented to person, place, and time.      Gait: Gait is intact.   Psychiatric:         Mood and Affect: Affect normal.        Imagin2021 cardiac stress test preliminary results    Labs  Recent labs from 2021 reviewed with the patient.  BNP and troponin are negative  Assessment and Plan:   The following treatment plan was discussed    1. Other chest pain  Resolved.  Patient currently denies any chest discomfort.  I suspect he was having an upper viral respiratory infection.  We also discussed possible hypersensitivity to allergens due to post Covid infection.    2. SOB (shortness of breath)  Chronic, improving.  I recommended that the patient continue to make the appointment with a pulmonologist for further evaluation of his shortness of breath and possible apnea while he sleeping.  Patient agrees with this and when we will go ahead and set up that appointment.    3. Left foot pain  Chronic, uncontrolled.  I recommended that the patient wear supportive shoes while walking for long periods of time.  I also encouraged him to keep his sports medicine visit for further evaluation.    4. Chronic pain of right knee  Chronic, controlled.  I recommended that the patient continue with his sports medicine visit for further evaluation of his knee.      Followup: Return if symptoms worsen or fail to improve.      Please note that this dictation was created using voice recognition software. I have made every reasonable attempt to correct obvious errors, but I expect that there are  errors of grammar and possibly content that I did not discover before finalizing the note.

## 2021-06-11 ENCOUNTER — EH NON-PROVIDER (OUTPATIENT)
Dept: OCCUPATIONAL MEDICINE | Facility: CLINIC | Age: 36
End: 2021-06-11

## 2021-06-11 ENCOUNTER — EMPLOYEE HEALTH (OUTPATIENT)
Dept: OCCUPATIONAL MEDICINE | Facility: CLINIC | Age: 36
End: 2021-06-11

## 2021-06-11 ENCOUNTER — HOSPITAL ENCOUNTER (OUTPATIENT)
Facility: MEDICAL CENTER | Age: 36
End: 2021-06-11
Attending: PREVENTIVE MEDICINE
Payer: COMMERCIAL

## 2021-06-11 DIAGNOSIS — Z02.89 ENCOUNTER FOR OCCUPATIONAL HEALTH ASSESSMENT: ICD-10-CM

## 2021-06-11 DIAGNOSIS — Z02.89 ENCOUNTER FOR OCCUPATIONAL HEALTH ASSESSMENT: Primary | ICD-10-CM

## 2021-06-11 LAB
AMP AMPHETAMINE: NORMAL
BAR BARBITURATES: NORMAL
BZO BENZODIAZEPINES: NORMAL
COC COCAINE: NORMAL
INT CON NEG: NORMAL
INT CON POS: NORMAL
MDMA ECSTASY: NORMAL
MET METHAMPHETAMINES: NORMAL
MEV IGG SER IA-ACNC: 0.31
MTD METHADONE: NORMAL
MUV IGG SER IA-ACNC: 0.67
OPI OPIATES: NORMAL
OXY OXYCODONE: NORMAL
PCP PHENCYCLIDINE: NORMAL
POC URINE DRUG SCREEN OCDRS: NORMAL
RUBV AB SER QL: 112 IU/ML
THC: NORMAL
VZV IGG SER IA-ACNC: 2.7

## 2021-06-11 PROCEDURE — 8915 PR COMPREHENSIVE PHYSICAL: Performed by: PREVENTIVE MEDICINE

## 2021-06-11 PROCEDURE — 94375 RESPIRATORY FLOW VOLUME LOOP: CPT | Performed by: PREVENTIVE MEDICINE

## 2021-06-11 PROCEDURE — 86765 RUBEOLA ANTIBODY: CPT | Performed by: PREVENTIVE MEDICINE

## 2021-06-11 PROCEDURE — 90715 TDAP VACCINE 7 YRS/> IM: CPT | Performed by: PREVENTIVE MEDICINE

## 2021-06-11 PROCEDURE — 90746 HEPB VACCINE 3 DOSE ADULT IM: CPT | Performed by: PREVENTIVE MEDICINE

## 2021-06-11 PROCEDURE — 86762 RUBELLA ANTIBODY: CPT | Performed by: PREVENTIVE MEDICINE

## 2021-06-11 PROCEDURE — 86735 MUMPS ANTIBODY: CPT | Performed by: PREVENTIVE MEDICINE

## 2021-06-11 PROCEDURE — 86480 TB TEST CELL IMMUN MEASURE: CPT | Performed by: PREVENTIVE MEDICINE

## 2021-06-11 PROCEDURE — 86787 VARICELLA-ZOSTER ANTIBODY: CPT | Performed by: PREVENTIVE MEDICINE

## 2021-06-11 PROCEDURE — 80305 DRUG TEST PRSMV DIR OPT OBS: CPT | Performed by: PREVENTIVE MEDICINE

## 2021-06-14 LAB
GAMMA INTERFERON BACKGROUND BLD IA-ACNC: 0.02 IU/ML
M TB IFN-G BLD-IMP: NEGATIVE
M TB IFN-G CD4+ BCKGRND COR BLD-ACNC: -0.01 IU/ML
MITOGEN IGNF BCKGRD COR BLD-ACNC: >10 IU/ML
QFT TB2 - NIL TBQ2: 0.01 IU/ML

## 2021-06-17 ENCOUNTER — TELEPHONE (OUTPATIENT)
Dept: OCCUPATIONAL MEDICINE | Facility: CLINIC | Age: 36
End: 2021-06-17

## 2021-06-23 ENCOUNTER — EH NON-PROVIDER (OUTPATIENT)
Dept: OCCUPATIONAL MEDICINE | Facility: CLINIC | Age: 36
End: 2021-06-23

## 2021-06-23 DIAGNOSIS — Z71.85 IMMUNIZATION COUNSELING: ICD-10-CM

## 2021-06-30 ENCOUNTER — APPOINTMENT (OUTPATIENT)
Dept: MEDICAL GROUP | Facility: MEDICAL CENTER | Age: 36
End: 2021-06-30
Payer: COMMERCIAL

## 2021-07-01 ENCOUNTER — NON-PROVIDER VISIT (OUTPATIENT)
Dept: MEDICAL GROUP | Facility: MEDICAL CENTER | Age: 36
End: 2021-07-01
Payer: COMMERCIAL

## 2021-07-01 DIAGNOSIS — Z23 NEED FOR MMR VACCINE: ICD-10-CM

## 2021-07-01 PROCEDURE — 90707 MMR VACCINE SC: CPT | Performed by: FAMILY MEDICINE

## 2021-07-01 PROCEDURE — 90471 IMMUNIZATION ADMIN: CPT | Performed by: FAMILY MEDICINE

## 2021-07-01 NOTE — LETTER
July 1, 2021        RE: Izaiah Marin    To Whom It May Concern;    Izaiah Marin is seen in my office for primary care. Titers show he his immune to varicella. Booster dose of MMR was given in clinic 7/1/21. Last dose of Tdap and Hepatitis B vaccines were give 6/11/21.    Please contact me with any questions.    Sincerely,              Ethel REYES

## 2021-07-02 NOTE — NON-PROVIDER
"Izaiah Marin is a 36 y.o. male here for a non-provider visit for:   MMR 1 of 1    Reason for immunization: Overdue/Provider Recommended  Immunization records indicate need for vaccine: Yes, confirmed with Epic  Minimum interval has been met for this vaccine: Yes  ABN completed: No    VIS Dated  08/15/2019 was given to patient: Yes  All IAC Questionnaire questions were answered \"No.\"    Patient tolerated injection and no adverse effects were observed or reported: Yes    Pt scheduled for next dose in series: Not Indicated  "

## 2021-09-01 ENCOUNTER — NON-PROVIDER VISIT (OUTPATIENT)
Dept: URGENT CARE | Facility: CLINIC | Age: 36
End: 2021-09-01

## 2021-09-01 DIAGNOSIS — Z02.1 PRE-EMPLOYMENT DRUG SCREENING: ICD-10-CM

## 2021-09-01 LAB
AMP AMPHETAMINE: NORMAL
COC COCAINE: NORMAL
INT CON NEG: NORMAL
INT CON POS: NORMAL
MET METHAMPHETAMINES: NORMAL
OPI OPIATES: NORMAL
PCP PHENCYCLIDINE: NORMAL
POC DRUG COMMENT 753798-OCCUPATIONAL HEALTH: NEGATIVE
THC: NORMAL

## 2021-09-01 PROCEDURE — 80305 DRUG TEST PRSMV DIR OPT OBS: CPT | Performed by: FAMILY MEDICINE

## 2022-10-13 ENCOUNTER — OFFICE VISIT (OUTPATIENT)
Dept: MEDICAL GROUP | Facility: PHYSICIAN GROUP | Age: 37
End: 2022-10-13
Payer: COMMERCIAL

## 2022-10-13 VITALS
HEART RATE: 60 BPM | SYSTOLIC BLOOD PRESSURE: 112 MMHG | RESPIRATION RATE: 16 BRPM | OXYGEN SATURATION: 98 % | DIASTOLIC BLOOD PRESSURE: 54 MMHG | WEIGHT: 199 LBS | HEIGHT: 74 IN | BODY MASS INDEX: 25.54 KG/M2 | TEMPERATURE: 98 F

## 2022-10-13 DIAGNOSIS — E78.5 DYSLIPIDEMIA: ICD-10-CM

## 2022-10-13 DIAGNOSIS — F90.9 ATTENTION DEFICIT HYPERACTIVITY DISORDER (ADHD), UNSPECIFIED ADHD TYPE: ICD-10-CM

## 2022-10-13 DIAGNOSIS — G47.30 SLEEP APNEA, UNSPECIFIED TYPE: ICD-10-CM

## 2022-10-13 DIAGNOSIS — R01.1 MURMUR: ICD-10-CM

## 2022-10-13 PROCEDURE — 99214 OFFICE O/P EST MOD 30 MIN: CPT | Performed by: INTERNAL MEDICINE

## 2022-10-13 RX ORDER — COVID-19 MOLECULAR TEST ASSAY
KIT MISCELLANEOUS
COMMUNITY
Start: 2022-08-22 | End: 2022-10-26

## 2022-10-13 RX ORDER — DEXTROAMPHETAMINE SACCHARATE, AMPHETAMINE ASPARTATE, DEXTROAMPHETAMINE SULFATE AND AMPHETAMINE SULFATE 2.5; 2.5; 2.5; 2.5 MG/1; MG/1; MG/1; MG/1
TABLET ORAL
Qty: 30 TABLET | Refills: 0 | Status: SHIPPED | OUTPATIENT
Start: 2022-10-13 | End: 2022-11-03

## 2022-10-13 SDOH — ECONOMIC STABILITY: TRANSPORTATION INSECURITY
IN THE PAST 12 MONTHS, HAS LACK OF TRANSPORTATION KEPT YOU FROM MEETINGS, WORK, OR FROM GETTING THINGS NEEDED FOR DAILY LIVING?: NO

## 2022-10-13 SDOH — ECONOMIC STABILITY: FOOD INSECURITY: WITHIN THE PAST 12 MONTHS, THE FOOD YOU BOUGHT JUST DIDN'T LAST AND YOU DIDN'T HAVE MONEY TO GET MORE.: PATIENT DECLINED

## 2022-10-13 SDOH — ECONOMIC STABILITY: INCOME INSECURITY: IN THE LAST 12 MONTHS, WAS THERE A TIME WHEN YOU WERE NOT ABLE TO PAY THE MORTGAGE OR RENT ON TIME?: NO

## 2022-10-13 SDOH — ECONOMIC STABILITY: FOOD INSECURITY: WITHIN THE PAST 12 MONTHS, YOU WORRIED THAT YOUR FOOD WOULD RUN OUT BEFORE YOU GOT MONEY TO BUY MORE.: PATIENT DECLINED

## 2022-10-13 SDOH — ECONOMIC STABILITY: HOUSING INSECURITY: IN THE LAST 12 MONTHS, HOW MANY PLACES HAVE YOU LIVED?: 1

## 2022-10-13 SDOH — ECONOMIC STABILITY: INCOME INSECURITY: HOW HARD IS IT FOR YOU TO PAY FOR THE VERY BASICS LIKE FOOD, HOUSING, MEDICAL CARE, AND HEATING?: NOT VERY HARD

## 2022-10-13 SDOH — HEALTH STABILITY: PHYSICAL HEALTH: ON AVERAGE, HOW MANY MINUTES DO YOU ENGAGE IN EXERCISE AT THIS LEVEL?: 100 MIN

## 2022-10-13 SDOH — HEALTH STABILITY: PHYSICAL HEALTH: ON AVERAGE, HOW MANY DAYS PER WEEK DO YOU ENGAGE IN MODERATE TO STRENUOUS EXERCISE (LIKE A BRISK WALK)?: 6 DAYS

## 2022-10-13 ASSESSMENT — FIBROSIS 4 INDEX: FIB4 SCORE: 0.57

## 2022-10-13 ASSESSMENT — SOCIAL DETERMINANTS OF HEALTH (SDOH)
HOW OFTEN DO YOU HAVE A DRINK CONTAINING ALCOHOL: 2-3 TIMES A WEEK
IN A TYPICAL WEEK, HOW MANY TIMES DO YOU TALK ON THE PHONE WITH FAMILY, FRIENDS, OR NEIGHBORS?: ONCE A WEEK
DO YOU BELONG TO ANY CLUBS OR ORGANIZATIONS SUCH AS CHURCH GROUPS UNIONS, FRATERNAL OR ATHLETIC GROUPS, OR SCHOOL GROUPS?: NO
HOW OFTEN DO YOU ATTEND CHURCH OR RELIGIOUS SERVICES?: NEVER
HOW OFTEN DO YOU ATTENT MEETINGS OF THE CLUB OR ORGANIZATION YOU BELONG TO?: NEVER
IN A TYPICAL WEEK, HOW MANY TIMES DO YOU TALK ON THE PHONE WITH FAMILY, FRIENDS, OR NEIGHBORS?: ONCE A WEEK
HOW HARD IS IT FOR YOU TO PAY FOR THE VERY BASICS LIKE FOOD, HOUSING, MEDICAL CARE, AND HEATING?: NOT VERY HARD
HOW OFTEN DO YOU HAVE SIX OR MORE DRINKS ON ONE OCCASION: NEVER
HOW OFTEN DO YOU GET TOGETHER WITH FRIENDS OR RELATIVES?: ONCE A WEEK
HOW MANY DRINKS CONTAINING ALCOHOL DO YOU HAVE ON A TYPICAL DAY WHEN YOU ARE DRINKING: 1 OR 2
HOW OFTEN DO YOU ATTENT MEETINGS OF THE CLUB OR ORGANIZATION YOU BELONG TO?: NEVER
HOW OFTEN DO YOU GET TOGETHER WITH FRIENDS OR RELATIVES?: ONCE A WEEK
ARE YOU MARRIED, WIDOWED, DIVORCED, SEPARATED, NEVER MARRIED, OR LIVING WITH A PARTNER?: NEVER MARRIED
HOW OFTEN DO YOU ATTEND CHURCH OR RELIGIOUS SERVICES?: NEVER
WITHIN THE PAST 12 MONTHS, YOU WORRIED THAT YOUR FOOD WOULD RUN OUT BEFORE YOU GOT THE MONEY TO BUY MORE: PATIENT DECLINED
DO YOU BELONG TO ANY CLUBS OR ORGANIZATIONS SUCH AS CHURCH GROUPS UNIONS, FRATERNAL OR ATHLETIC GROUPS, OR SCHOOL GROUPS?: NO
ARE YOU MARRIED, WIDOWED, DIVORCED, SEPARATED, NEVER MARRIED, OR LIVING WITH A PARTNER?: NEVER MARRIED

## 2022-10-13 ASSESSMENT — LIFESTYLE VARIABLES
AUDIT-C TOTAL SCORE: 3
SKIP TO QUESTIONS 9-10: 1
HOW MANY STANDARD DRINKS CONTAINING ALCOHOL DO YOU HAVE ON A TYPICAL DAY: 1 OR 2
HOW OFTEN DO YOU HAVE SIX OR MORE DRINKS ON ONE OCCASION: NEVER
HOW OFTEN DO YOU HAVE A DRINK CONTAINING ALCOHOL: 2-3 TIMES A WEEK

## 2022-10-13 ASSESSMENT — PATIENT HEALTH QUESTIONNAIRE - PHQ9: CLINICAL INTERPRETATION OF PHQ2 SCORE: 0

## 2022-10-13 NOTE — ASSESSMENT & PLAN NOTE
Chronic condition noted with previous lab test.  I recommend a fasting blood test to be done for follow-up

## 2022-10-13 NOTE — PROGRESS NOTES
"PRIMARY CARE CLINIC VISIT  Chief Complaint   Patient presents with    New Patient   ADHD.  Refill Adderall  Sleep clinic referral      History of Present Illness     ADHD  This is a chronic condition.  Patient reported that he has been treated with Adderall.  Patient stated that his previous PCP would not refill the prescription as the patient does not see a psychiatrist.   Patient reported that he was diagnosed with ADHD for several years   patient stated that he has been stable on this medication without any side effects.  The patient feels that he does not need to see the psychiatrist.    Dyslipidemia  Chronic condition noted with previous lab test.  I recommend a fasting blood test to be done for follow-up    Sleep apnea  Patient was noted by his significant other to have recurrent apneic episodes regular basis.  Patient requests a referral to the sleep medicine clinic to evaluate for possible sleep apnea.    No current outpatient medications on file prior to visit.     No current facility-administered medications on file prior to visit.        Allergies: Patient has no known allergies.    ROS  As per HPI above. All other systems reviewed and negative.      Past Medical, Social, and Family history reviewed and updated in EPIC     Objective     /54 (BP Location: Left arm, Patient Position: Sitting, BP Cuff Size: Adult)   Pulse 60   Temp 36.7 °C (98 °F) (Temporal)   Resp 16   Ht 1.88 m (6' 2\")   Wt 90.3 kg (199 lb)   SpO2 98%    Body mass index is 25.55 kg/m².    General: alert in no apparent distress.  Cardiovascular: regular rate and rhythm grade 1-2/6 systolic murmur noted no radiation   Pulmonary: lungs : no wheezing   Gastrointestinal: BS present. No obvious mass noted          Assessment and Plan     1. Attention deficit hyperactivity disorder (ADHD), unspecified ADHD type  This is a chronic condition.  The patient has been seen diagnosed with this condition for several years and has been stable " while taking Adderall 10 mg daily.  I have agreed to send a prescription to the pharmacy.  Potential side effects of medication discussed with the patient.  - amphetamine-dextroamphetamine (ADDERALL) 10 MG Tab; 1 tab daily as needed  Dispense: 30 Tablet; Refill: 0  Recommend follow-up in 4 months.  2. Dyslipidemia  - Basic Metabolic Panel; Future  - HEMOGLOBIN A1C; Future  - Lipid Profile; Future  Recommend low-fat low-cholesterol diet.  Lab tests ordered.  3. Murmur  Patient is asymptomatic.  - EC-ECHOCARDIOGRAM COMPLETE W/ CONT; Future  Commend follow-up after echo done.  4. Sleep apnea    - Referral to Pulmonary and Sleep Medicine                      Healthcare Maintenance     Health Maintenance Due   Topic Date Due    IMM HEP B VACCINE (3 of 3 - 3-dose series) 08/06/2021    IMM INFLUENZA (1) Never done         Patient declined the above immunizations    Please note that this dictation was created using voice recognition software. I have made every reasonable attempt to correct obvious errors, but I expect that there are errors of grammar and possibly content that I did not discover before finalizing the note.    Geoffrey Cline MD  Internal Medicine  [facility] care [facility]

## 2022-10-13 NOTE — ASSESSMENT & PLAN NOTE
Patient was noted by his significant other to have recurrent apneic episodes regular basis.  Patient requests a referral to the sleep medicine clinic to evaluate for possible sleep apnea.

## 2022-10-13 NOTE — ASSESSMENT & PLAN NOTE
This is a chronic condition.  Patient reported that he has been treated with Adderall.  Patient stated that his previous PCP would not refill the prescription as the patient does not see a psychiatrist.   Patient reported that he was diagnosed with ADHD for several years   patient stated that he has been stable on this medication without any side effects.  The patient feels that he does not need to see the psychiatrist.

## 2022-10-17 ENCOUNTER — TELEPHONE (OUTPATIENT)
Dept: MEDICAL GROUP | Facility: PHYSICIAN GROUP | Age: 37
End: 2022-10-17
Payer: COMMERCIAL

## 2022-10-17 NOTE — TELEPHONE ENCOUNTER
FINAL PRIOR AUTHORIZATION STATUS:    1.  Name of Medication & Dose: Adderall     2. Prior Auth Status: Approved through 10/17/2023     3. Action Taken: Pharmacy Notified: no Patient Notified: yes

## 2022-10-21 ENCOUNTER — TELEPHONE (OUTPATIENT)
Dept: HEALTH INFORMATION MANAGEMENT | Facility: OTHER | Age: 37
End: 2022-10-21

## 2022-10-26 ENCOUNTER — OFFICE VISIT (OUTPATIENT)
Dept: MEDICAL GROUP | Facility: PHYSICIAN GROUP | Age: 37
End: 2022-10-26
Payer: COMMERCIAL

## 2022-10-26 ENCOUNTER — RESEARCH ENCOUNTER (OUTPATIENT)
Dept: MEDICAL GROUP | Facility: PHYSICIAN GROUP | Age: 37
End: 2022-10-26
Payer: COMMERCIAL

## 2022-10-26 ENCOUNTER — HOSPITAL ENCOUNTER (OUTPATIENT)
Dept: LAB | Facility: MEDICAL CENTER | Age: 37
End: 2022-10-26
Attending: INTERNAL MEDICINE
Payer: COMMERCIAL

## 2022-10-26 VITALS
DIASTOLIC BLOOD PRESSURE: 54 MMHG | TEMPERATURE: 98.6 F | WEIGHT: 199 LBS | SYSTOLIC BLOOD PRESSURE: 96 MMHG | BODY MASS INDEX: 25.54 KG/M2 | HEART RATE: 69 BPM | HEIGHT: 74 IN | RESPIRATION RATE: 16 BRPM | OXYGEN SATURATION: 97 %

## 2022-10-26 DIAGNOSIS — G89.29 CHRONIC TOE PAIN, LEFT FOOT: ICD-10-CM

## 2022-10-26 DIAGNOSIS — M25.512 CHRONIC LEFT SHOULDER PAIN: ICD-10-CM

## 2022-10-26 DIAGNOSIS — Z12.9 CANCER SCREENING: ICD-10-CM

## 2022-10-26 DIAGNOSIS — G89.29 CHRONIC LEFT SHOULDER PAIN: ICD-10-CM

## 2022-10-26 DIAGNOSIS — M54.50 CHRONIC LOW BACK PAIN, UNSPECIFIED BACK PAIN LATERALITY, UNSPECIFIED WHETHER SCIATICA PRESENT: ICD-10-CM

## 2022-10-26 DIAGNOSIS — G89.29 CHRONIC LOW BACK PAIN, UNSPECIFIED BACK PAIN LATERALITY, UNSPECIFIED WHETHER SCIATICA PRESENT: ICD-10-CM

## 2022-10-26 DIAGNOSIS — M79.675 CHRONIC TOE PAIN, LEFT FOOT: ICD-10-CM

## 2022-10-26 DIAGNOSIS — M25.562 CHRONIC PAIN OF BOTH KNEES: ICD-10-CM

## 2022-10-26 DIAGNOSIS — Z00.6 RESEARCH STUDY PATIENT: ICD-10-CM

## 2022-10-26 DIAGNOSIS — E78.5 DYSLIPIDEMIA: ICD-10-CM

## 2022-10-26 DIAGNOSIS — L84 CALLUS: ICD-10-CM

## 2022-10-26 DIAGNOSIS — M79.641 RIGHT HAND PAIN: ICD-10-CM

## 2022-10-26 DIAGNOSIS — M25.561 CHRONIC PAIN OF BOTH KNEES: ICD-10-CM

## 2022-10-26 DIAGNOSIS — G89.29 CHRONIC PAIN OF BOTH KNEES: ICD-10-CM

## 2022-10-26 LAB
ANION GAP SERPL CALC-SCNC: 9 MMOL/L (ref 7–16)
BUN SERPL-MCNC: 15 MG/DL (ref 8–22)
CALCIUM SERPL-MCNC: 9.3 MG/DL (ref 8.5–10.5)
CHLORIDE SERPL-SCNC: 104 MMOL/L (ref 96–112)
CHOLEST SERPL-MCNC: 184 MG/DL (ref 100–199)
CO2 SERPL-SCNC: 25 MMOL/L (ref 20–33)
CREAT SERPL-MCNC: 0.98 MG/DL (ref 0.5–1.4)
EST. AVERAGE GLUCOSE BLD GHB EST-MCNC: 111 MG/DL
FASTING STATUS PATIENT QL REPORTED: NORMAL
GFR SERPLBLD CREATININE-BSD FMLA CKD-EPI: 103 ML/MIN/1.73 M 2
GLUCOSE SERPL-MCNC: 103 MG/DL (ref 65–99)
HBA1C MFR BLD: 5.5 % (ref 4–5.6)
HDLC SERPL-MCNC: 57 MG/DL
LDLC SERPL CALC-MCNC: 115 MG/DL
POTASSIUM SERPL-SCNC: 4.6 MMOL/L (ref 3.6–5.5)
SODIUM SERPL-SCNC: 138 MMOL/L (ref 135–145)
TRIGL SERPL-MCNC: 62 MG/DL (ref 0–149)

## 2022-10-26 PROCEDURE — 80061 LIPID PANEL: CPT

## 2022-10-26 PROCEDURE — 36415 COLL VENOUS BLD VENIPUNCTURE: CPT

## 2022-10-26 PROCEDURE — 80048 BASIC METABOLIC PNL TOTAL CA: CPT

## 2022-10-26 PROCEDURE — 99214 OFFICE O/P EST MOD 30 MIN: CPT | Performed by: INTERNAL MEDICINE

## 2022-10-26 PROCEDURE — 83036 HEMOGLOBIN GLYCOSYLATED A1C: CPT

## 2022-10-26 ASSESSMENT — FIBROSIS 4 INDEX: FIB4 SCORE: 0.57

## 2022-10-26 NOTE — PROGRESS NOTES
PRIMARY CARE CLINIC VISIT    Chief complaint:    Callus left foot  Chronic low back pain  Bilateral knee pain  Left great toe pain  Right hand pain  Left shoulder pain  Requests a referral to the [facility]    History of Present Illness     Callus  Chronic condition affecting the left foot.  It is causing pain especially with walking.  Patient requests a referral to see a podiatrist.    Chronic low back pain  Chronic condition.  The patient is working as a .  Patient reported low back pain noted for more than 1 year.  He denies any recent trauma or injury.  Patient denies bowel or bladder dysfunction.  Patient denies perineal paresthesia.  He denies any history of unexplained weight loss.    Bilateral knee pain  Chronic condition.  Patient reported bilateral knee pain noted for several years.  Patient reported torn meniscus on the right knee approximately 10 years ago..  Stated that he has been favoring more on the left knee recently.  He denies recent trauma or injury.    Chronic toe pain, left foot  Chronic condition affecting the left great toe at the first MTP joint.  He denies recent trauma or injury.    Right hand pain  This is a new condition.  The patient stated that while working out he accidentally kicked the right hand proximately 3 months ago.  Pain is noted in the right fourth MCP joint.    Shoulder pain, left  This is a chronic condition.  The patient denies recent trauma or injury.  He denies motor weakness or paresthesia.    Current Outpatient Medications on File Prior to Visit   Medication Sig Dispense Refill    amphetamine-dextroamphetamine (ADDERALL) 10 MG Tab 1 tab daily as needed 30 Tablet 0     No current facility-administered medications on file prior to visit.        Allergies: Patient has no known allergies.    ROS  As per HPI above. All other systems reviewed and negative.      Past Medical, Social, and Family history reviewed and updated in EPIC     Objective     BP  "(!) 96/54 (BP Location: Left arm, Patient Position: Sitting, BP Cuff Size: Adult)   Pulse 69   Temp 37 °C (98.6 °F) (Temporal)   Resp 16   Ht 1.88 m (6' 2\")   Wt 90.3 kg (199 lb)   SpO2 97%    Body mass index is 25.55 kg/m².    General: alert in no apparent distress.  Cardiovascular: regular rate and rhythm  Pulmonary: lungs : no wheezing   Gastrointestinal: BS present. No obvious mass noted    Left foot there is a callus noted in the side of the foot laterally measuring approximately 4 x 5 mm in size there is no redness or fluctuance noted.    Left great toe no swelling redness or deformity range of motion full    Knees : No signficant swelling redness or deformity.   ROM limited due to pain specifically w knee flexion/extension.   No signficant instability noted w varus/valgus maneuvers     Left shoulder.    no significant swelling redness or deformity.   ROM limited due to pain dontae w shoulder abduction, flexion and extension  Pos impingement sign. Neg thumb down abduction test     Low back: No significant spinal deformity noted.   Pain noted w palpation of the lumbar region.  ROM : flexion, extension, rotation, lateral bend of back limited due to pain         Assessment and Plan     1. Callus left foot causing pain  - Referral to Podiatry  Recommend the use of callus pads which can be purchased over-the-counter.    2. Chronic toe pain, left foot  - Referral to Podiatry  - DX-FOOT-2- LEFT; Future    3. Chronic pain of both knees  Exam today is suggestive of degenerative joint.  The patient reported prior history of the torn meniscus right knee.  - Referral to Orthopedics  - MR-KNEE-W/O LEFT; Future  - MR-KNEE-W/O RIGHT; Future    4. Right hand pain status post injury.  - Referral to Orthopedics  - DX-HAND 2- RIGHT; Future    5. Chronic left shoulder pain  Exam today suggestive of rotator cuff tendinitis/bursitis.  Rule out other causes.  - Referral to Orthopedics  - DX-SHOULDER 2+ LEFT; Future    6. Chronic " low back pain, unspecified back pain laterality, unspecified whether sciatica present  - Referral to Orthopedics  - DX-LUMBAR SPINE-4+ VIEWS; Future    - Advised pt re: neck/ back care, posture and regular stretching exercises.   Rec to maintain ideal weight.   Ice/heat application as directed.  Avoid heavy lifting or activities which may aggravate pt's condition.   Consider the use of over the counter topical treatment such as Icy/hot or Biofreeze      7. Cancer screening  - Referral to Genetic Research Studies                    Please note that this dictation was created using voice recognition software. I have made every reasonable attempt to correct obvious errors, but I expect that there are errors of grammar and possibly content that I did not discover before finalizing the note.    Geoffrey Cline MD  Internal Medicine  [facility]

## 2022-10-26 NOTE — ASSESSMENT & PLAN NOTE
Chronic condition.  The patient is working as a .  Patient reported low back pain noted for more than 1 year.  He denies any recent trauma or injury.  Patient denies bowel or bladder dysfunction.  Patient denies perineal paresthesia.  He denies any history of unexplained weight loss.

## 2022-10-26 NOTE — ASSESSMENT & PLAN NOTE
Chronic condition.  Patient reported bilateral knee pain noted for several years.  Patient reported torn meniscus on the right knee approximately 10 years ago..  Stated that he has been favoring more on the left knee recently.  He denies recent trauma or injury.

## 2022-10-26 NOTE — ASSESSMENT & PLAN NOTE
Chronic condition affecting the left foot.  It is causing pain especially with walking.  Patient requests a referral to see a podiatrist.

## 2022-10-26 NOTE — ASSESSMENT & PLAN NOTE
This is a new condition.  The patient stated that while working out he accidentally kicked the right hand proximately 3 months ago.  Pain is noted in the right fourth MCP joint.

## 2022-10-26 NOTE — ASSESSMENT & PLAN NOTE
This is a chronic condition.  The patient denies recent trauma or injury.  He denies motor weakness or paresthesia.

## 2022-10-26 NOTE — ASSESSMENT & PLAN NOTE
Chronic condition affecting the left great toe at the first MTP joint.  He denies recent trauma or injury.

## 2022-11-03 DIAGNOSIS — F90.9 ATTENTION DEFICIT HYPERACTIVITY DISORDER (ADHD), UNSPECIFIED ADHD TYPE: ICD-10-CM

## 2022-11-03 RX ORDER — DEXTROAMPHETAMINE SACCHARATE, AMPHETAMINE ASPARTATE MONOHYDRATE, DEXTROAMPHETAMINE SULFATE AND AMPHETAMINE SULFATE 5; 5; 5; 5 MG/1; MG/1; MG/1; MG/1
20 CAPSULE, EXTENDED RELEASE ORAL EVERY MORNING
Qty: 30 CAPSULE | Refills: 0 | Status: SHIPPED | OUTPATIENT
Start: 2022-11-03 | End: 2022-12-09 | Stop reason: SDUPTHER

## 2022-11-10 ENCOUNTER — APPOINTMENT (OUTPATIENT)
Dept: RADIOLOGY | Facility: MEDICAL CENTER | Age: 37
End: 2022-11-10
Attending: INTERNAL MEDICINE
Payer: COMMERCIAL

## 2022-11-10 DIAGNOSIS — M79.675 CHRONIC TOE PAIN, LEFT FOOT: ICD-10-CM

## 2022-11-10 DIAGNOSIS — M54.50 CHRONIC LOW BACK PAIN, UNSPECIFIED BACK PAIN LATERALITY, UNSPECIFIED WHETHER SCIATICA PRESENT: ICD-10-CM

## 2022-11-10 DIAGNOSIS — G89.29 CHRONIC LEFT SHOULDER PAIN: ICD-10-CM

## 2022-11-10 DIAGNOSIS — M79.641 RIGHT HAND PAIN: ICD-10-CM

## 2022-11-10 DIAGNOSIS — G89.29 CHRONIC TOE PAIN, LEFT FOOT: ICD-10-CM

## 2022-11-10 DIAGNOSIS — G89.29 CHRONIC LOW BACK PAIN, UNSPECIFIED BACK PAIN LATERALITY, UNSPECIFIED WHETHER SCIATICA PRESENT: ICD-10-CM

## 2022-11-10 DIAGNOSIS — M25.512 CHRONIC LEFT SHOULDER PAIN: ICD-10-CM

## 2022-11-10 PROCEDURE — 73030 X-RAY EXAM OF SHOULDER: CPT | Mod: LT

## 2022-11-10 PROCEDURE — 73620 X-RAY EXAM OF FOOT: CPT | Mod: LT

## 2022-11-10 PROCEDURE — 72110 X-RAY EXAM L-2 SPINE 4/>VWS: CPT

## 2022-11-10 PROCEDURE — 73120 X-RAY EXAM OF HAND: CPT | Mod: RT

## 2022-11-15 ENCOUNTER — APPOINTMENT (OUTPATIENT)
Dept: RADIOLOGY | Facility: MEDICAL CENTER | Age: 37
End: 2022-11-15
Attending: INTERNAL MEDICINE
Payer: COMMERCIAL

## 2022-11-15 DIAGNOSIS — G89.29 CHRONIC PAIN OF BOTH KNEES: ICD-10-CM

## 2022-11-15 DIAGNOSIS — M25.562 CHRONIC PAIN OF BOTH KNEES: ICD-10-CM

## 2022-11-15 DIAGNOSIS — M25.561 CHRONIC PAIN OF BOTH KNEES: ICD-10-CM

## 2022-11-15 PROCEDURE — 73721 MRI JNT OF LWR EXTRE W/O DYE: CPT | Mod: LT

## 2022-11-15 PROCEDURE — 73721 MRI JNT OF LWR EXTRE W/O DYE: CPT | Mod: RT

## 2022-11-28 LAB
APOB+LDLR+PCSK9 GENE MUT ANL BLD/T: NOT DETECTED
BRCA1+BRCA2 DEL+DUP + FULL MUT ANL BLD/T: NOT DETECTED
MLH1+MSH2+MSH6+PMS2 GN DEL+DUP+FUL M: NOT DETECTED

## 2022-12-09 DIAGNOSIS — F90.9 ATTENTION DEFICIT HYPERACTIVITY DISORDER (ADHD), UNSPECIFIED ADHD TYPE: ICD-10-CM

## 2022-12-10 RX ORDER — DEXTROAMPHETAMINE SACCHARATE, AMPHETAMINE ASPARTATE MONOHYDRATE, DEXTROAMPHETAMINE SULFATE AND AMPHETAMINE SULFATE 5; 5; 5; 5 MG/1; MG/1; MG/1; MG/1
20 CAPSULE, EXTENDED RELEASE ORAL EVERY MORNING
Qty: 30 CAPSULE | Refills: 0 | Status: SHIPPED | OUTPATIENT
Start: 2022-12-10 | End: 2023-02-24 | Stop reason: SDUPTHER

## 2023-01-05 ENCOUNTER — NON-PROVIDER VISIT (OUTPATIENT)
Dept: MEDICAL GROUP | Facility: PHYSICIAN GROUP | Age: 38
End: 2023-01-05
Payer: COMMERCIAL

## 2023-01-05 ENCOUNTER — OFFICE VISIT (OUTPATIENT)
Dept: MEDICAL GROUP | Facility: PHYSICIAN GROUP | Age: 38
End: 2023-01-05
Payer: COMMERCIAL

## 2023-01-05 ENCOUNTER — HOSPITAL ENCOUNTER (OUTPATIENT)
Dept: LAB | Facility: MEDICAL CENTER | Age: 38
End: 2023-01-05
Attending: INTERNAL MEDICINE
Payer: COMMERCIAL

## 2023-01-05 VITALS
WEIGHT: 198 LBS | SYSTOLIC BLOOD PRESSURE: 106 MMHG | TEMPERATURE: 98.4 F | HEIGHT: 74 IN | HEART RATE: 72 BPM | BODY MASS INDEX: 25.41 KG/M2 | RESPIRATION RATE: 16 BRPM | DIASTOLIC BLOOD PRESSURE: 56 MMHG | OXYGEN SATURATION: 97 %

## 2023-01-05 DIAGNOSIS — Z02.0 ENCOUNTER FOR SCHOOL HEALTH EXAMINATION: ICD-10-CM

## 2023-01-05 DIAGNOSIS — Z23 NEED FOR VACCINATION: ICD-10-CM

## 2023-01-05 DIAGNOSIS — Z00.00 WELLNESS EXAMINATION: ICD-10-CM

## 2023-01-05 PROCEDURE — 90471 IMMUNIZATION ADMIN: CPT | Performed by: NURSE PRACTITIONER

## 2023-01-05 PROCEDURE — 90746 HEPB VACCINE 3 DOSE ADULT IM: CPT | Performed by: NURSE PRACTITIONER

## 2023-01-05 PROCEDURE — 99395 PREV VISIT EST AGE 18-39: CPT | Performed by: INTERNAL MEDICINE

## 2023-01-05 PROCEDURE — 36415 COLL VENOUS BLD VENIPUNCTURE: CPT

## 2023-01-05 PROCEDURE — 86480 TB TEST CELL IMMUN MEASURE: CPT

## 2023-01-05 ASSESSMENT — PATIENT HEALTH QUESTIONNAIRE - PHQ9: CLINICAL INTERPRETATION OF PHQ2 SCORE: 0

## 2023-01-05 ASSESSMENT — FIBROSIS 4 INDEX: FIB4 SCORE: 0.57

## 2023-01-05 NOTE — PROGRESS NOTES
"Subjective:     CC:   Chief Complaint   Patient presents with    Other     Discuss vaccines         HPI:   Izaiah Marin is a 37-year-old male who presents for annual wellness visit.  He is a patient of Dr. Geoffrey Cline.  The patient feels well and has no acute medical complaints.  He reports that his diet is healthy and he exercises 7 days/week. The patient's past medical and surgical history, medications, allergies, and family history were reviewed.  The patient is up to date on labs and other preventative care.         Past Medical History:   Diagnosis Date    ADD (attention deficit disorder)     Hx of Clostridium difficile infection        Social History     Tobacco Use    Smoking status: Never    Smokeless tobacco: Never   Vaping Use    Vaping Use: Never used   Substance Use Topics    Alcohol use: Not Currently     Alcohol/week: 2.4 oz     Types: 4 Glasses of wine per week    Drug use: No       No current [facility]-ordered outpatient medications on file.     No current Epic-ordered facility-administered medications on file.       Allergies:  Patient has no known allergies.    Health Maintenance: Completed    Review of Systems:  Constitutional: Negative for fever, chills.  Respiratory: Negative for cough and shortness of breath.    Cardiovascular: Negative for chest pain or palpitations.  Gastrointestinal: Negative for abdominal pain, nausea, vomiting, and diarrhea.   Neurological: Negative for headaches, numbness, or tingling.  Psychiatric: Negative for anxiety or depression.      Objective:       Exam:  /56 (BP Location: Right arm, Patient Position: Sitting, BP Cuff Size: Large adult)   Pulse 72   Temp 36.9 °C (98.4 °F) (Temporal)   Resp 16   Ht 1.88 m (6' 2\")   Wt 89.8 kg (198 lb)   SpO2 97%   BMI 25.42 kg/m²  Body mass index is 25.42 kg/m².    Constitutional: Well-developed, no acute distress.  HEENT: Pupils are equal, round, and reactive to light. Oropharynx is without erythema, edema or exudates. "   Neck: No thyromegaly or palpable thyroid nodules. No cervical or supraclavicular lymphadenopathy noted.  Lungs: Clear to auscultation bilaterally. No wheezes, rhonchi, or rales.   Cardiovascular: Regular rate and rhythm, no murmurs noted.   Abdomen: Soft, nontender, nondistended.   Extremities: No edema or erythema.  Psychiatric:  Behavior, mood, and affect are appropriate.    Assessment & Plan:       Wellness examination  Encounter for school health examination  The patient feels well and denies any complaints. There are no concerning signs and/or symptoms of any significant disease process.  Normal exam findings.  The patient was counseled about regular exercise, healthy diet, abstinence from smoking, drugs, and excessive alcohol.   HCM: Completed  Labs per orders  Immunizations per orders    Need for vaccination  - Quantiferon Gold TB (PPD); Future  - Hep B Adult 20+        Return in about 1 year (around 1/5/2024) for Annual/Wellness Visit.    Please note that this dictation was created using voice recognition software. I have made every reasonable attempt to correct obvious errors, but I expect that there are errors of grammar and possibly content that I did not discover before finalizing the note.

## 2023-01-06 LAB
GAMMA INTERFERON BACKGROUND BLD IA-ACNC: 0.04 IU/ML
M TB IFN-G BLD-IMP: NEGATIVE
M TB IFN-G CD4+ BCKGRND COR BLD-ACNC: 0 IU/ML
MITOGEN IGNF BCKGRD COR BLD-ACNC: >10 IU/ML
QFT TB2 - NIL TBQ2: 0 IU/ML

## 2023-01-09 ENCOUNTER — NON-PROVIDER VISIT (OUTPATIENT)
Dept: MEDICAL GROUP | Facility: PHYSICIAN GROUP | Age: 38
End: 2023-01-09
Payer: COMMERCIAL

## 2023-01-09 DIAGNOSIS — Z23 NEED FOR VACCINATION: ICD-10-CM

## 2023-01-09 DIAGNOSIS — Z23 NEED FOR MMR VACCINE: ICD-10-CM

## 2023-01-09 PROCEDURE — 90707 MMR VACCINE SC: CPT | Performed by: INTERNAL MEDICINE

## 2023-01-09 PROCEDURE — 90471 IMMUNIZATION ADMIN: CPT | Performed by: INTERNAL MEDICINE

## 2023-01-09 NOTE — PROGRESS NOTES
"Izaiah Marin is a 37 y.o. male here for a non-provider visit for:   MMR 1 of 1    Reason for immunization: needed for work/school  Immunization records indicate need for vaccine: Yes, confirmed with Epic  Minimum interval has been met for this vaccine: Yes  ABN completed: Yes    VIS Dated  08/06/2021 was given to patient: Yes  All IAC Questionnaire questions were answered \"No.\"    Patient tolerated injection and no adverse effects were observed or reported: Yes    Pt scheduled for next dose in series: Not Indicated   "

## 2023-01-09 NOTE — PROGRESS NOTES
"Izaiah Marin is a 37 y.o. male here for a non-provider visit for:   HEPATITIS B 3 of 3    Reason for immunization: Overdue/Provider Recommended  Immunization records indicate need for vaccine: Yes, confirmed with Epic  Minimum interval has been met for this vaccine: Yes  ABN completed: Not Indicated    VIS Dated  10/15/21 was given to patient: Yes  All IAC Questionnaire questions were answered \"No.\"    Patient tolerated injection and no adverse effects were observed or reported: Yes    Pt scheduled for next dose in series: Not Indicated    "

## 2023-02-24 DIAGNOSIS — F90.9 ATTENTION DEFICIT HYPERACTIVITY DISORDER (ADHD), UNSPECIFIED ADHD TYPE: ICD-10-CM

## 2023-02-24 RX ORDER — DEXTROAMPHETAMINE SACCHARATE, AMPHETAMINE ASPARTATE, DEXTROAMPHETAMINE SULFATE AND AMPHETAMINE SULFATE 5; 5; 5; 5 MG/1; MG/1; MG/1; MG/1
20 TABLET ORAL DAILY
Qty: 30 TABLET | Refills: 0 | Status: SHIPPED | OUTPATIENT
Start: 2023-02-24 | End: 2023-04-01 | Stop reason: SDUPTHER

## 2023-02-24 RX ORDER — DEXTROAMPHETAMINE SACCHARATE, AMPHETAMINE ASPARTATE MONOHYDRATE, DEXTROAMPHETAMINE SULFATE AND AMPHETAMINE SULFATE 5; 5; 5; 5 MG/1; MG/1; MG/1; MG/1
20 CAPSULE, EXTENDED RELEASE ORAL EVERY MORNING
Qty: 30 CAPSULE | Refills: 0 | Status: SHIPPED | OUTPATIENT
Start: 2023-02-24 | End: 2023-02-24

## 2023-03-17 ENCOUNTER — APPOINTMENT (OUTPATIENT)
Dept: SLEEP MEDICINE | Facility: MEDICAL CENTER | Age: 38
End: 2023-03-17
Attending: STUDENT IN AN ORGANIZED HEALTH CARE EDUCATION/TRAINING PROGRAM
Payer: COMMERCIAL

## 2023-04-01 DIAGNOSIS — F90.9 ATTENTION DEFICIT HYPERACTIVITY DISORDER (ADHD), UNSPECIFIED ADHD TYPE: ICD-10-CM

## 2023-04-03 RX ORDER — DEXTROAMPHETAMINE SACCHARATE, AMPHETAMINE ASPARTATE, DEXTROAMPHETAMINE SULFATE AND AMPHETAMINE SULFATE 5; 5; 5; 5 MG/1; MG/1; MG/1; MG/1
20 TABLET ORAL DAILY
Qty: 30 TABLET | Refills: 0 | Status: SHIPPED | OUTPATIENT
Start: 2023-04-03 | End: 2023-05-12 | Stop reason: SDUPTHER

## 2023-04-18 ENCOUNTER — OFFICE VISIT (OUTPATIENT)
Dept: MEDICAL GROUP | Facility: PHYSICIAN GROUP | Age: 38
End: 2023-04-18
Payer: COMMERCIAL

## 2023-04-18 VITALS
BODY MASS INDEX: 27.12 KG/M2 | DIASTOLIC BLOOD PRESSURE: 80 MMHG | TEMPERATURE: 98 F | HEIGHT: 72 IN | RESPIRATION RATE: 20 BRPM | SYSTOLIC BLOOD PRESSURE: 122 MMHG | OXYGEN SATURATION: 99 % | HEART RATE: 82 BPM | WEIGHT: 200.25 LBS

## 2023-04-18 DIAGNOSIS — M79.671 BILATERAL FOOT PAIN: ICD-10-CM

## 2023-04-18 DIAGNOSIS — F90.9 ATTENTION DEFICIT HYPERACTIVITY DISORDER (ADHD), UNSPECIFIED ADHD TYPE: ICD-10-CM

## 2023-04-18 DIAGNOSIS — R73.9 HYPERGLYCEMIA: ICD-10-CM

## 2023-04-18 DIAGNOSIS — M79.672 BILATERAL FOOT PAIN: ICD-10-CM

## 2023-04-18 DIAGNOSIS — E78.5 DYSLIPIDEMIA: Chronic | ICD-10-CM

## 2023-04-18 PROBLEM — R73.03 PREDIABETES: Status: ACTIVE | Noted: 2023-04-18

## 2023-04-18 PROBLEM — R73.03 PREDIABETES: Chronic | Status: ACTIVE | Noted: 2023-04-18

## 2023-04-18 PROCEDURE — 99214 OFFICE O/P EST MOD 30 MIN: CPT | Performed by: INTERNAL MEDICINE

## 2023-04-18 RX ORDER — CEPHALEXIN 500 MG/1
500 CAPSULE ORAL 4 TIMES DAILY
Qty: 28 CAPSULE | Refills: 0 | Status: SHIPPED | OUTPATIENT
Start: 2023-04-18 | End: 2023-09-18

## 2023-04-18 ASSESSMENT — FIBROSIS 4 INDEX: FIB4 SCORE: 0.57

## 2023-04-18 NOTE — PROGRESS NOTES
PRIMARY CARE CLINIC VISIT    Chief complaint:    Bilateral foot pain  Follow-up ADHD  Follow-up elevated glucose and hyperlipidemia      History of Present Illness     Hyperglycemia  This is a new condition noted with recent blood test.  The patient reported that it was a nonfasting blood test.  I did order a follow-up fasting blood test to check glucose and A1c.    Dyslipidemia  Chronic condition for the patient presently on diet therapy.  Lab test requested for follow-up.    ADHD  Chronic condition.  The patient is being treated with Adderall 20 Mg daily.  Patient reported that his symptoms are well controlled.  No significant side effects reported.    The patient signed the consent form today.    Bilateral foot pain  This is a new condition.    Patient reported that approximately 1 week ago the patient fell off a ladder while painting the house.  Patient reported injury of the left third toe and in the left first MTP joint area.  Chart review shows the patient is up-to-date regarding tetanus shot.    The patient also complaining of right foot pain and swelling noted in the toes.  Patient reported that he has been doing rockclimbing and patient denied recent trauma or injury.  Patient stated that with rockclimbing he does watch his toes between the rocks frequently for traction.    Current Outpatient Medications on File Prior to Visit   Medication Sig Dispense Refill    amphetamine-dextroamphetamine (ADDERALL) 20 MG Tab Take 1 Tablet by mouth every day for 30 days. 30 Tablet 0     No current facility-administered medications on file prior to visit.        Allergies: Patient has no known allergies.    Current Outpatient Medications Ordered in Epic   Medication Sig Dispense Refill    cephALEXin (KEFLEX) 500 MG Cap Take 1 Capsule by mouth 4 times a day. 28 Capsule 0    amphetamine-dextroamphetamine (ADDERALL) 20 MG Tab Take 1 Tablet by mouth every day for 30 days. 30 Tablet 0     No current [facility]-ordered  facility-administered medications on file.       Past Medical History:   Diagnosis Date    ADD (attention deficit disorder)     Hx of Clostridium difficile infection        Past Surgical History:   Procedure Laterality Date    IRRIGATION & DEBRIDEMENT ORTHO Right 9/29/2015    Procedure: I & D RIGHT INDEX FINGER;  Surgeon: Neal Nolen M.D.;  Location: SURGERY [facility];  Service:        Family History   Problem Relation Age of Onset    Thyroid Mother     Alzheimer's Disease Father     ADD / ADHD Father     Asthma Brother     Alzheimer's Disease Maternal Grandmother     Arthritis Maternal Grandfather     Alzheimer's Disease Paternal Grandmother     No Known Problems Paternal Grandfather        Social History     Tobacco Use   Smoking Status Never   Smokeless Tobacco Never       Social History     Substance and Sexual Activity   Alcohol Use Not Currently    Alcohol/week: 2.4 oz    Types: 4 Glasses of wine per week       Review of systems.  As per HPI above. All other systems reviewed and negative.      Past Medical, Social, and Family history reviewed and updated in EPIC     Objective     /80 (BP Location: Left arm, Patient Position: Sitting, BP Cuff Size: Adult)   Pulse 82   Temp 36.7 °C (98 °F) (Temporal)   Resp 20   Ht 1.829 m (6')   Wt 90.8 kg (200 lb 4 oz)   SpO2 99%    Body mass index is 27.16 kg/m².    General: alert in no apparent distress.  Cardiovascular: regular rate and rhythm  Pulmonary: lungs : no wheezing   Gastrointestinal: BS present. No obvious mass noted  Left foot there is superficial abrasion noted in the left first MTP region.  The left third toe with mild soft tissue swelling base of the nail slightly tender to the touch no fluctuance noted    Right foot there is mild swelling noted at the right third toe base of nail soft tissue area.  No fluctuance or discharge noted.  The right great toe base of nail soft tissue also with mild redness/swelling.  No fluctuance.      Lab  Results   Component Value Date/Time    HBA1C 5.5 10/26/2022 02:08 PM       Lab Results   Component Value Date/Time    WBC 5.6 04/22/2021 09:40 AM    HEMOGLOBIN 15.8 04/22/2021 09:40 AM    HEMATOCRIT 46.9 04/22/2021 09:40 AM    MCV 89.2 04/22/2021 09:40 AM    PLATELETCT 275 04/22/2021 09:40 AM         Lab Results   Component Value Date/Time    SODIUM 138 10/26/2022 02:08 PM    POTASSIUM 4.6 10/26/2022 02:08 PM    GLUCOSE 103 (H) 10/26/2022 02:08 PM    BUN 15 10/26/2022 02:08 PM    CREATININE 0.98 10/26/2022 02:08 PM       Lab Results   Component Value Date/Time    CHOLSTRLTOT 184 10/26/2022 02:08 PM     (H) 10/26/2022 02:08 PM    HDL 57 10/26/2022 02:08 PM    TRIGLYCERIDE 62 10/26/2022 02:08 PM       Lab Results   Component Value Date/Time    ALTSGPT 22 04/22/2021 09:40 AM             Assessment and Plan     1. Bilateral foot pain  Patient is status post fall 1 week ago  Exam today suggestive of mild soft tissue infection   Recommend patient to start antibiotic Keflex 4 times a day for 7 days  Other orders  - cephALEXin (KEFLEX) 500 MG Cap; Take 1 Capsule by mouth 4 times a day.  Dispense: 28 Capsule; Refill: 0  Potential side effect of the medication discussed with the patient.    - DX-FOOT-COMPLETE 3+ LEFT; Future  - DX-FOOT-COMPLETE 3+ RIGHT; Future  Recommend follow-up in 7 days.    2. Attention deficit hyperactivity disorder (ADHD), unspecified ADHD type  Chronic stable condition.  Continue with Adderall 20 Mg daily.  Patient signed the consent form today.    3. Dyslipidemia  - Lipid Profile; Future  Chronic condition.  Current status unclear.  Lab test ordered for follow-up.    4. Hyperglycemia  - Basic Metabolic Panel; Future  - HEMOGLOBIN A1C; Future  Chronic condition.  Fasting lab test requested.  Recommend follow-up after lab test done.  Continue with low sweet low-carb diet.                      Please note that this dictation was created using voice recognition software. I have made every  reasonable attempt to correct obvious errors, but I expect that there are errors of grammar and possibly content that I did not discover before finalizing the note.    Geoffrey Cline MD  Internal Medicine  [facility]

## 2023-04-18 NOTE — ASSESSMENT & PLAN NOTE
This is a new condition noted with recent blood test.  The patient reported that it was a nonfasting blood test.  I did order a follow-up fasting blood test to check glucose and A1c.

## 2023-04-18 NOTE — ASSESSMENT & PLAN NOTE
This is a new condition.    Patient reported that approximately 1 week ago the patient fell off a ladder while painting the house.  Patient reported injury of the left third toe and in the left first MTP joint area.  Chart review shows the patient is up-to-date regarding tetanus shot.    The patient also complaining of right foot pain and swelling noted in the toes.  Patient reported that he has been doing rockclimbing and patient denied recent trauma or injury.  Patient stated that with rockclimbing he does watch his toes between the rocks frequently for traction.

## 2023-04-18 NOTE — ASSESSMENT & PLAN NOTE
Chronic condition.  The patient is being treated with Adderall 20 Mg daily.  Patient reported that his symptoms are well controlled.  No significant side effects reported.    The patient signed the consent form today.

## 2023-05-02 ENCOUNTER — ANCILLARY PROCEDURE (OUTPATIENT)
Dept: CARDIOLOGY | Facility: MEDICAL CENTER | Age: 38
End: 2023-05-02
Attending: INTERNAL MEDICINE
Payer: COMMERCIAL

## 2023-05-02 DIAGNOSIS — R01.1 MURMUR: ICD-10-CM

## 2023-05-02 LAB
LV EJECT FRACT  99904: 60
LV EJECT FRACT MOD 2C 99903: 72.65
LV EJECT FRACT MOD 4C 99902: 47.88
LV EJECT FRACT MOD BP 99901: 61.82

## 2023-05-02 PROCEDURE — 93306 TTE W/DOPPLER COMPLETE: CPT | Mod: 26 | Performed by: INTERNAL MEDICINE

## 2023-05-02 PROCEDURE — 93306 TTE W/DOPPLER COMPLETE: CPT

## 2023-05-12 DIAGNOSIS — F90.9 ATTENTION DEFICIT HYPERACTIVITY DISORDER (ADHD), UNSPECIFIED ADHD TYPE: ICD-10-CM

## 2023-05-13 ENCOUNTER — HOSPITAL ENCOUNTER (OUTPATIENT)
Dept: RADIOLOGY | Facility: MEDICAL CENTER | Age: 38
End: 2023-05-13
Attending: INTERNAL MEDICINE
Payer: COMMERCIAL

## 2023-05-13 ENCOUNTER — HOSPITAL ENCOUNTER (OUTPATIENT)
Dept: RADIOLOGY | Facility: MEDICAL CENTER | Age: 38
End: 2023-05-13
Attending: ORTHOPAEDIC SURGERY
Payer: COMMERCIAL

## 2023-05-13 DIAGNOSIS — M79.671 BILATERAL FOOT PAIN: ICD-10-CM

## 2023-05-13 DIAGNOSIS — M25.562 ACUTE PAIN OF LEFT KNEE: ICD-10-CM

## 2023-05-13 DIAGNOSIS — M79.672 BILATERAL FOOT PAIN: ICD-10-CM

## 2023-05-13 DIAGNOSIS — M25.561 ACUTE PAIN OF RIGHT KNEE: ICD-10-CM

## 2023-05-13 PROCEDURE — 73630 X-RAY EXAM OF FOOT: CPT | Mod: LT

## 2023-05-13 PROCEDURE — 73564 X-RAY EXAM KNEE 4 OR MORE: CPT | Mod: LT

## 2023-05-13 PROCEDURE — 73564 X-RAY EXAM KNEE 4 OR MORE: CPT | Mod: RT

## 2023-05-16 RX ORDER — DEXTROAMPHETAMINE SACCHARATE, AMPHETAMINE ASPARTATE, DEXTROAMPHETAMINE SULFATE AND AMPHETAMINE SULFATE 5; 5; 5; 5 MG/1; MG/1; MG/1; MG/1
20 TABLET ORAL DAILY
Qty: 30 TABLET | Refills: 0 | Status: SHIPPED | OUTPATIENT
Start: 2023-05-16 | End: 2023-06-18 | Stop reason: SDUPTHER

## 2023-06-18 DIAGNOSIS — F90.9 ATTENTION DEFICIT HYPERACTIVITY DISORDER (ADHD), UNSPECIFIED ADHD TYPE: ICD-10-CM

## 2023-06-20 RX ORDER — DEXTROAMPHETAMINE SACCHARATE, AMPHETAMINE ASPARTATE, DEXTROAMPHETAMINE SULFATE AND AMPHETAMINE SULFATE 5; 5; 5; 5 MG/1; MG/1; MG/1; MG/1
20 TABLET ORAL DAILY
Qty: 30 TABLET | Refills: 0 | Status: SHIPPED | OUTPATIENT
Start: 2023-06-20 | End: 2023-06-29 | Stop reason: SDUPTHER

## 2023-06-28 ENCOUNTER — PATIENT MESSAGE (OUTPATIENT)
Dept: MEDICAL GROUP | Facility: PHYSICIAN GROUP | Age: 38
End: 2023-06-28
Payer: COMMERCIAL

## 2023-06-28 DIAGNOSIS — F90.9 ATTENTION DEFICIT HYPERACTIVITY DISORDER (ADHD), UNSPECIFIED ADHD TYPE: ICD-10-CM

## 2023-06-29 RX ORDER — DEXTROAMPHETAMINE SACCHARATE, AMPHETAMINE ASPARTATE, DEXTROAMPHETAMINE SULFATE AND AMPHETAMINE SULFATE 5; 5; 5; 5 MG/1; MG/1; MG/1; MG/1
20 TABLET ORAL DAILY
Qty: 30 TABLET | Refills: 0 | Status: SHIPPED | OUTPATIENT
Start: 2023-06-29 | End: 2023-08-09 | Stop reason: SDUPTHER

## 2023-07-18 ENCOUNTER — HOSPITAL ENCOUNTER (OUTPATIENT)
Dept: RADIOLOGY | Facility: MEDICAL CENTER | Age: 38
End: 2023-07-18
Attending: PHYSICIAN ASSISTANT
Payer: COMMERCIAL

## 2023-07-18 ENCOUNTER — OFFICE VISIT (OUTPATIENT)
Dept: URGENT CARE | Facility: PHYSICIAN GROUP | Age: 38
End: 2023-07-18
Payer: COMMERCIAL

## 2023-07-18 VITALS
HEART RATE: 68 BPM | TEMPERATURE: 98.7 F | SYSTOLIC BLOOD PRESSURE: 120 MMHG | HEIGHT: 74 IN | DIASTOLIC BLOOD PRESSURE: 60 MMHG | WEIGHT: 190 LBS | OXYGEN SATURATION: 97 % | BODY MASS INDEX: 24.38 KG/M2 | RESPIRATION RATE: 18 BRPM

## 2023-07-18 DIAGNOSIS — S59.901A ELBOW INJURY, RIGHT, INITIAL ENCOUNTER: ICD-10-CM

## 2023-07-18 DIAGNOSIS — R21 RASH AND NONSPECIFIC SKIN ERUPTION: ICD-10-CM

## 2023-07-18 DIAGNOSIS — S50.01XA CONTUSION OF RIGHT ELBOW, INITIAL ENCOUNTER: ICD-10-CM

## 2023-07-18 PROCEDURE — 73080 X-RAY EXAM OF ELBOW: CPT | Mod: RT

## 2023-07-18 PROCEDURE — 3078F DIAST BP <80 MM HG: CPT | Performed by: PHYSICIAN ASSISTANT

## 2023-07-18 PROCEDURE — 3074F SYST BP LT 130 MM HG: CPT | Performed by: PHYSICIAN ASSISTANT

## 2023-07-18 PROCEDURE — 99213 OFFICE O/P EST LOW 20 MIN: CPT | Performed by: PHYSICIAN ASSISTANT

## 2023-07-18 ASSESSMENT — ENCOUNTER SYMPTOMS
VOMITING: 0
FATIGUE: 0
SHORTNESS OF BREATH: 0
SORE THROAT: 0
WEAKNESS: 0
RHINORRHEA: 0
ARTHRALGIAS: 1
COUGH: 0
FEVER: 0
NAUSEA: 0
CHILLS: 0
HEADACHES: 0
MYALGIAS: 0
NUMBNESS: 0
JOINT SWELLING: 1

## 2023-07-18 NOTE — PROGRESS NOTES
Subjective     Izaiah Marin is a 38 y.o. male who presents with Elbow Injury (X 2 wk ago pt. States he was grocery shopping and a can of beans fell and hit his Rt. Elbow. )            Arm Injury  This is a new problem. Episode onset: 2 weeks ago. Patient was at the grocery store when a can dropped on his right elbow. He has pain with gripping or carrying. Associated symptoms include arthralgias, joint swelling and a rash. Pertinent negatives include no chills, congestion, coughing, fatigue, fever, headaches, myalgias, nausea, numbness, sore throat, vomiting or weakness. Associated symptoms comments: Right elbow pain . Exacerbated by: gripping, carrying. He has tried nothing for the symptoms.   Rash  This is a new problem. The current episode started in the past 7 days (3-4 days). The problem is unchanged. The affected locations include the torso and back. Rash characteristics: bumps. It is unknown if there was an exposure to a precipitant. Pertinent negatives include no congestion, cough, fatigue, fever, joint pain, rhinorrhea, shortness of breath, sore throat or vomiting. Past treatments include nothing.     The patient denies any itching associated with the rash. He also just had a bad sunburn to the area. He stays in a [place] with other guys. No one else has a rash    Past Medical History:   Diagnosis Date    ADD (attention deficit disorder)     Hx of Clostridium difficile infection          Past Surgical History:   Procedure Laterality Date    IRRIGATION & DEBRIDEMENT ORTHO Right 9/29/2015    Procedure: I & D RIGHT INDEX FINGER;  Surgeon: Neal Nolen M.D.;  Location: SURGERY [facility];  Service:          Family History   Problem Relation Age of Onset    Thyroid Mother     Alzheimer's Disease Father     ADD / ADHD Father     Asthma Brother     Alzheimer's Disease Maternal Grandmother     Arthritis Maternal Grandfather     Alzheimer's Disease Paternal Grandmother     No Known Problems Paternal  "Grandfather          Patient has no known allergies.      Medications, Allergies, and current problem list reviewed today in Epic      Review of Systems   Constitutional:  Negative for chills, fatigue, fever and malaise/fatigue.   HENT:  Negative for congestion, rhinorrhea and sore throat.    Respiratory:  Negative for cough and shortness of breath.    Gastrointestinal:  Negative for nausea and vomiting.   Musculoskeletal:  Positive for arthralgias and joint swelling. Negative for joint pain and myalgias.   Skin:  Positive for itching and rash.   Neurological:  Negative for weakness, numbness and headaches.     All other systems reviewed and are negative.            Objective     /60 (BP Location: Left arm, Patient Position: Sitting, BP Cuff Size: Adult)   Pulse 68   Temp 37.1 °C (98.7 °F) (Temporal)   Resp 18   Ht 1.88 m (6' 2\")   Wt 86.2 kg (190 lb)   SpO2 97%   BMI 24.39 kg/m²      Physical Exam  Constitutional:       General: He is not in acute distress.     Appearance: He is not ill-appearing.   HENT:      Head: Normocephalic and atraumatic.   Eyes:      Conjunctiva/sclera: Conjunctivae normal.   Cardiovascular:      Rate and Rhythm: Normal rate and regular rhythm.   Pulmonary:      Effort: Pulmonary effort is normal. No respiratory distress.      Breath sounds: No stridor. No wheezing.   Musculoskeletal:      Right elbow: Swelling present. No deformity. Decreased range of motion. Tenderness (moderate TTP lateral epicondyle.) present in lateral epicondyle.      Comments: Right hand    Skin:     General: Skin is warm and dry.      Findings: Rash present. Rash is papular (rash on back).   Neurological:      General: No focal deficit present.      Mental Status: He is alert and oriented to person, place, and time.   Psychiatric:         Mood and Affect: Mood normal.         Behavior: Behavior normal.         Thought Content: Thought content normal.         Judgment: Judgment normal.                 "   7/18/2023 2:39 PM     HISTORY/REASON FOR EXAM:  Right elbow pain after injury lifting weights weeks ago.        TECHNIQUE/EXAM DESCRIPTION AND NUMBER OF VIEWS:  3 views of the RIGHT elbow.     COMPARISON: None     FINDINGS:  There is no focal soft tissue swelling.     There is no evidence of a joint effusion.     There is no evidence of displaced fracture or dislocation.           IMPRESSION:     1.  Unremarkable right elbow series.             Assessment & Plan        1. Contusion of right elbow, initial encounter  DX-ELBOW-COMPLETE 3+ RIGHT      2. Rash and nonspecific skin eruption          X-ray reviewed.    RICE  OTC Analgesics.   OTC Cortisone rash     Differential diagnoses, Supportive care, and indications for immediate follow-up discussed with patient.   Pathogenesis of diagnosis discussed including typical length and natural progression.   Instructed to return to clinic or nearest emergency department for any change in condition, further concerns, or worsening of symptoms.        The patient demonstrated a good understanding and agreed with the treatment plan.      Aziza Barnhart P.A.-C.

## 2023-08-24 ENCOUNTER — OCCUPATIONAL MEDICINE (OUTPATIENT)
Dept: URGENT CARE | Facility: PHYSICIAN GROUP | Age: 38
End: 2023-08-24
Payer: OTHER MISCELLANEOUS

## 2023-08-24 VITALS
BODY MASS INDEX: 24.77 KG/M2 | HEIGHT: 74 IN | OXYGEN SATURATION: 99 % | WEIGHT: 193 LBS | DIASTOLIC BLOOD PRESSURE: 78 MMHG | RESPIRATION RATE: 14 BRPM | SYSTOLIC BLOOD PRESSURE: 118 MMHG | HEART RATE: 66 BPM | TEMPERATURE: 98.2 F

## 2023-08-24 DIAGNOSIS — S30.0XXA LUMBAR CONTUSION, INITIAL ENCOUNTER: ICD-10-CM

## 2023-08-24 DIAGNOSIS — M70.21 OLECRANON BURSITIS OF RIGHT ELBOW: ICD-10-CM

## 2023-08-24 DIAGNOSIS — S60.411A ABRASION OF LEFT INDEX FINGER, INITIAL ENCOUNTER: ICD-10-CM

## 2023-08-24 PROCEDURE — 3074F SYST BP LT 130 MM HG: CPT | Performed by: PHYSICIAN ASSISTANT

## 2023-08-24 PROCEDURE — 99214 OFFICE O/P EST MOD 30 MIN: CPT | Performed by: PHYSICIAN ASSISTANT

## 2023-08-24 PROCEDURE — 3078F DIAST BP <80 MM HG: CPT | Performed by: PHYSICIAN ASSISTANT

## 2023-08-24 RX ORDER — CEPHALEXIN 500 MG/1
500 CAPSULE ORAL 3 TIMES DAILY
Qty: 15 CAPSULE | Refills: 0 | Status: SHIPPED | OUTPATIENT
Start: 2023-08-24 | End: 2023-08-29

## 2023-08-24 ASSESSMENT — ENCOUNTER SYMPTOMS
NECK PAIN: 0
BLURRED VISION: 0
HEADACHES: 0
WEAKNESS: 0
NAUSEA: 0
DOUBLE VISION: 0
DIZZINESS: 0
VOMITING: 0
LOSS OF CONSCIOUSNESS: 0
TINGLING: 0
FALLS: 1
FEVER: 0
BACK PAIN: 1
BRUISES/BLEEDS EASILY: 0
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
## 2023-09-18 ENCOUNTER — HOSPITAL ENCOUNTER (OUTPATIENT)
Dept: RADIOLOGY | Facility: MEDICAL CENTER | Age: 38
End: 2023-09-18
Attending: FAMILY MEDICINE
Payer: OTHER MISCELLANEOUS

## 2023-09-18 ENCOUNTER — OCCUPATIONAL MEDICINE (OUTPATIENT)
Dept: URGENT CARE | Facility: PHYSICIAN GROUP | Age: 38
End: 2023-09-18
Payer: OTHER MISCELLANEOUS

## 2023-09-18 VITALS
BODY MASS INDEX: 24.77 KG/M2 | WEIGHT: 193 LBS | OXYGEN SATURATION: 98 % | RESPIRATION RATE: 18 BRPM | DIASTOLIC BLOOD PRESSURE: 62 MMHG | TEMPERATURE: 97.5 F | HEIGHT: 74 IN | HEART RATE: 70 BPM | SYSTOLIC BLOOD PRESSURE: 122 MMHG

## 2023-09-18 DIAGNOSIS — S59.901D INJURY OF RIGHT ELBOW, SUBSEQUENT ENCOUNTER: ICD-10-CM

## 2023-09-18 PROCEDURE — 3074F SYST BP LT 130 MM HG: CPT | Performed by: FAMILY MEDICINE

## 2023-09-18 PROCEDURE — 99213 OFFICE O/P EST LOW 20 MIN: CPT | Performed by: FAMILY MEDICINE

## 2023-09-18 PROCEDURE — 3078F DIAST BP <80 MM HG: CPT | Performed by: FAMILY MEDICINE

## 2023-09-18 PROCEDURE — 73080 X-RAY EXAM OF ELBOW: CPT | Mod: RT

## 2023-09-18 ASSESSMENT — ENCOUNTER SYMPTOMS
FOCAL WEAKNESS: 0
SENSORY CHANGE: 0
ROS SKIN COMMENTS: NO ABRASION OR LACERATION
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
## 2023-09-19 NOTE — PROGRESS NOTES
"Subjective     Izaiah Marin is a 38 y.o. male who presents with Follow-Up (Workers Comp follow up Rt elbow)      DOI: 8/17/2023  F/u visit right elbow injury. DIANA: fire hose forcefully blew off of engine causing a fall. He struck his elbow directly to the ground.   Pain has persisted. 6-9/severity and sharp with use. He has not regained previous function. Right hand dominant. No prior elbow injury. No other aggravating or alleviating factors.       HPI    Review of Systems   Skin:         No abrasion or laceration     Neurological:  Negative for sensory change and focal weakness.              Objective     /62 (BP Location: Left arm, Patient Position: Sitting, BP Cuff Size: Adult)   Pulse 70   Temp 36.4 °C (97.5 °F)   Resp 18   Ht 1.88 m (6' 2\")   Wt 87.5 kg (193 lb)   SpO2 98%   BMI 24.78 kg/m²      Physical Exam    R elbow: tender posterior lateral aspect. Pain reproduced with  when arm is abducted. Small olecranon fluid collection. No redness or warmth.                    Assessment & Plan    visit 8/24/2023 reviewed     Xray: no fracture or dislocation per radiology    1. Injury of right elbow, subsequent encounter    - DX-ELBOW-COMPLETE 3+ RIGHT; Future  - MR-ELBOW-W/O RIGHT; Future  - Referral to Radiology  - Referral to Occupational Medicine  - Referral to Physical Therapy     Differential diagnosis, natural history, supportive care, and indications for immediate follow-up were discussed.     Work restrictions on D39    F/u occupational med clinic             "

## 2023-09-27 ENCOUNTER — HOSPITAL ENCOUNTER (OUTPATIENT)
Dept: RADIOLOGY | Facility: MEDICAL CENTER | Age: 38
End: 2023-09-27
Attending: FAMILY MEDICINE
Payer: OTHER MISCELLANEOUS

## 2023-09-27 DIAGNOSIS — S59.901D INJURY OF RIGHT ELBOW, SUBSEQUENT ENCOUNTER: ICD-10-CM

## 2023-09-27 PROCEDURE — 73221 MRI JOINT UPR EXTREM W/O DYE: CPT | Mod: RT

## 2023-10-04 ENCOUNTER — OCCUPATIONAL MEDICINE (OUTPATIENT)
Dept: OCCUPATIONAL MEDICINE | Facility: CLINIC | Age: 38
End: 2023-10-04
Payer: OTHER MISCELLANEOUS

## 2023-10-04 VITALS
DIASTOLIC BLOOD PRESSURE: 74 MMHG | BODY MASS INDEX: 25.04 KG/M2 | TEMPERATURE: 98.2 F | HEART RATE: 85 BPM | OXYGEN SATURATION: 97 % | SYSTOLIC BLOOD PRESSURE: 118 MMHG | RESPIRATION RATE: 14 BRPM | WEIGHT: 195 LBS

## 2023-10-04 DIAGNOSIS — S59.901D INJURY OF RIGHT ELBOW, SUBSEQUENT ENCOUNTER: ICD-10-CM

## 2023-10-04 DIAGNOSIS — S30.0XXD LUMBAR CONTUSION, SUBSEQUENT ENCOUNTER: ICD-10-CM

## 2023-10-04 DIAGNOSIS — M21.921 ACQUIRED DEFORMITY OF RIGHT ELBOW: ICD-10-CM

## 2023-10-04 PROCEDURE — 3078F DIAST BP <80 MM HG: CPT | Performed by: NURSE PRACTITIONER

## 2023-10-04 PROCEDURE — 3074F SYST BP LT 130 MM HG: CPT | Performed by: NURSE PRACTITIONER

## 2023-10-04 PROCEDURE — 99213 OFFICE O/P EST LOW 20 MIN: CPT | Performed by: NURSE PRACTITIONER

## 2023-10-04 RX ORDER — CYCLOBENZAPRINE HCL 5 MG
10 TABLET ORAL 3 TIMES DAILY PRN
Qty: 30 TABLET | Refills: 0 | Status: SHIPPED | OUTPATIENT
Start: 2023-10-04 | End: 2023-11-07

## 2023-10-04 RX ORDER — IBUPROFEN 800 MG/1
800 TABLET ORAL EVERY 8 HOURS PRN
Qty: 30 TABLET | Refills: 0 | Status: SHIPPED | OUTPATIENT
Start: 2023-10-04

## 2023-10-04 NOTE — LETTER
[address],   [address] [place] [address]  Phone:  [phone] - Fax:  [phone]   Occupational Health [facility] Progress Report and Disability Certification  Date of Service: 10/4/2023   No Show:  No  Date / Time of Next Visit: 11/2/2023@8:30 AM   Claim Information   Patient Name: Izaiah Marin  Claim Number:     Employer: [employer]  Date of Injury: 8/17/2023     Insurer / TPA: [employer]  ID / SSN:     Occupation:   Diagnosis: Diagnoses of Injury of right elbow, subsequent encounter and Acquired deformity of right elbow were pertinent to this visit.    Medical Information   Related to Industrial Injury? Yes    Subjective Complaints:  DOI: 8/17/2023 DIANA: fire hose forcefully blew off of engine causing a fall. He struck his elbow directly to the ground.  Today patient states symptoms have remained unchanged especially in the elbow.  Pain is constant, 6-10/severity and sharp with use.  The pain is also deep inside the elbow, it is radiating at times.  He has noticed decreased function in the elbow.   has weakened significantly.  He has not had any relief from compression.  He does use blue emu ointment.  He has been having his lower back massage.  He states that he has pain around the scar on the low back that radiates down the leg slightly.  Area pointed out close to SI joint.  He denies leg weakness, bowel or bladder changes, saddle anesthesia, fever, chills, or penile/groin pain.  He notes the low back pain is triggered with sitting for long periods of time.  He has been on light duty at work.  MRI results reviewed with patient.  Physiatry referral placed for further evaluation management symptoms.  Patient is scheduled to start physical therapy in November recommend reaching out to see if sooner available appointments.  Plan of care discussed with patient.   Objective Findings: Right elbow: tender posterior lateral  aspect. Pain reproduced with  when arm is abducted.  Positive olecranon fluid collection. No redness or warmth.   strength 3/5.    Lumbar: No gross deformity noted.  There is a well-healed scar noted to the SI joint on the left side.  Nonantalgic gait noted.   Pre-Existing Condition(s):     Assessment:   Condition Worsened    Status: Discharged / Care Transfer  Permanent Disability:No    Plan: Medication (NOT at Work)MedicationPTTransfer Care    Diagnostics:      Comments:  Follow-up at 5 weeks, unless seen by physiatry  Restricted duty, per physiatry  Physiatry referral placed, transfer care  Physical therapy appointments as scheduled  Take cyclobenzaprine as needed  Take ibuprofen as needed, ice/heat application, and OTC topical ointment of your choosing    Disability Information   Status: Released to Restricted Duty    From:  10/4/2023  Through: 11/2/2023 Restrictions are: Temporary   Physical Restrictions   Sitting:    Standing:    Stooping:    Bending:      Squatting:    Walking:    Climbing:    Pushing:  < or = to 2 hrs/day   Pulling:  < or = to 2 hrs/day Other:    Reaching Above Shoulder (L):   Reaching Above Shoulder (R):       Reaching Below Shoulder (L):    Reaching Below Shoulder (R):      Not to exceed Weight Limits   Carrying(hrs): 2 Weight Limit(lb): < or = to 10 pounds Lifting(hrs): 2 Weight  Limit(lb): < or = to 10 pounds   Comments: Restrictions are for right upper extremity    Repetitive Actions   Hands: i.e. Fine Manipulations from Grasping:     Feet: i.e. Operating Foot Controls:     Driving / Operate Machinery:     Health Care Provider’s Original or Electronic Signature  COLBY Ferreira Health Care Provider’s Original or Electronic Signature    Clement Tavera DO MPH     Clinic Name / Location: [address],   [address]  [place] [address] Clinic Phone Number: Dept: [phone]   Appointment Time: 7:45 Am Visit Start Time: 7:48 AM   Check-In  Time:  7:41 Am Visit Discharge Time:  10:09 AM   Original-Treating Physician or Chiropractor    Page 2-Insurer/TPA    Page 3-Employer    Page 4-Employee

## 2023-10-04 NOTE — PROGRESS NOTES
Subjective:     Izaiah Marin is a 38 y.o. male who presents for Follow-Up (SAME -  18/)      DOI: 8/17/2023 DIANA: fire hose forcefully blew off of engine causing a fall. He struck his elbow directly to the ground.  Today patient states symptoms have remained unchanged especially in the elbow.  Pain is constant, 6-10/severity and sharp with use.  The pain is also deep inside the elbow, it is radiating at times.  He has noticed decreased function in the elbow.   has weakened significantly.  He has not had any relief from compression.  He does use blue emu ointment.  He has been having his lower back massage.  He states that he has pain around the scar on the low back that radiates down the leg slightly.  Area pointed out close to SI joint.  He denies leg weakness, bowel or bladder changes, saddle anesthesia, fever, chills, or penile/groin pain.  He notes the low back pain is triggered with sitting for long periods of time.  He has been on light duty at work.  MRI results reviewed with patient.  Physiatry referral placed for further evaluation management symptoms.  Patient is scheduled to start physical therapy in November recommend reaching out to see if sooner available appointments.  Plan of care discussed with patient.    ROS: All systems were reviewed on intake form, form was reviewed and signed. See scanned documents in media. Pertinent positives and negatives included in HPI.    PMH: No pertinent past medical history to this problem  MEDS: Medications were reviewed in Epic  ALLERGIES: No Known Allergies  SOCHX: Works as  at [employer]  FH: No pertinent family history to this problem       Objective:     /74   Pulse 85   Temp 36.8 °C (98.2 °F) (Temporal)   Resp 14   Wt 88.5 kg (195 lb)   SpO2 97%   BMI 25.04 kg/m²     [unfilled]    Right elbow: tender posterior lateral aspect. Pain reproduced with  when arm is abducted.  Positive olecranon fluid collection. No redness or  warmth.   strength 3/5.    Lumbar: No gross deformity noted.  There is a well-healed scar noted to the SI joint on the left side.  Nonantalgic gait noted.    Assessment/Plan:       1. Injury of right elbow, subsequent encounter  - ibuprofen (MOTRIN) 800 MG Tab; Take 1 Tablet by mouth every 8 hours as needed for Moderate Pain or Inflammation.  Dispense: 30 Tablet; Refill: 0  - cyclobenzaprine (FLEXERIL) 5 mg tablet; Take 2 Tablets by mouth 3 times a day as needed for Moderate Pain.  Dispense: 30 Tablet; Refill: 0  - Referral to Pain Clinic    2. Acquired deformity of right elbow  - ibuprofen (MOTRIN) 800 MG Tab; Take 1 Tablet by mouth every 8 hours as needed for Moderate Pain or Inflammation.  Dispense: 30 Tablet; Refill: 0  - cyclobenzaprine (FLEXERIL) 5 mg tablet; Take 2 Tablets by mouth 3 times a day as needed for Moderate Pain.  Dispense: 30 Tablet; Refill: 0  - Referral to Pain Clinic    3. Lumbar contusion, subsequent encounter  - Referral to Pain Clinic    Released to Restricted Duty FROM 10/4/2023 TO 11/2/2023  Restrictions are for right upper extremity  Follow-up at 5 weeks, unless seen by physiatry  Restricted duty, per physiatry  Physiatry referral placed, transfer care  Physical therapy appointments as scheduled  Take cyclobenzaprine as needed  Take ibuprofen as needed, ice/heat application, and OTC topical ointment of your choosing    Differential diagnosis, natural history, supportive care, and indications for immediate follow-up discussed.    Approximately 25 minutes were spent in reviewing notes, preparing for visit, obtaining history, exam and evaluation, patient counseling/education and post visit documentation/orders.

## 2023-10-09 ENCOUNTER — OCCUPATIONAL MEDICINE (OUTPATIENT)
Dept: URGENT CARE | Facility: PHYSICIAN GROUP | Age: 38
End: 2023-10-09
Payer: OTHER MISCELLANEOUS

## 2023-10-09 VITALS
DIASTOLIC BLOOD PRESSURE: 70 MMHG | RESPIRATION RATE: 16 BRPM | BODY MASS INDEX: 25.03 KG/M2 | WEIGHT: 195 LBS | HEIGHT: 74 IN | SYSTOLIC BLOOD PRESSURE: 118 MMHG | HEART RATE: 72 BPM | TEMPERATURE: 98.1 F | OXYGEN SATURATION: 99 %

## 2023-10-09 DIAGNOSIS — S30.0XXD LUMBAR CONTUSION, SUBSEQUENT ENCOUNTER: ICD-10-CM

## 2023-10-09 DIAGNOSIS — S59.901D INJURY OF RIGHT ELBOW, SUBSEQUENT ENCOUNTER: ICD-10-CM

## 2023-10-09 DIAGNOSIS — M70.21 OLECRANON BURSITIS OF RIGHT ELBOW: ICD-10-CM

## 2023-10-09 PROCEDURE — 99213 OFFICE O/P EST LOW 20 MIN: CPT | Performed by: FAMILY MEDICINE

## 2023-10-09 PROCEDURE — 3074F SYST BP LT 130 MM HG: CPT | Performed by: FAMILY MEDICINE

## 2023-10-09 PROCEDURE — 3078F DIAST BP <80 MM HG: CPT | Performed by: FAMILY MEDICINE

## 2023-10-09 NOTE — PROGRESS NOTES
"  Subjective:     38 y.o. male presents for Work-Related Injury (Worker Comp [employer], DOI 08/17/2023, R elbow and left lower back injury)      DOI: 8/17/2023  DIANA: States he was working with a high-pressure hose when the hose blew and knocked the patient on the ground. Experiencing pain over the right elbow.  Describes a previous nonwork related injury to the right elbow 1 month ago.  No secondary employment.    Visit #4 today: Patient reports worsening in symptoms since prior visit, estimates he is 40% of his baseline.  Continues to experience left lower back pain and right elbow pain, currently rated 8/10.  He is using ibuprofen with moderate relief in symptoms.  Referral to physiatry has been approved, he is still trying to set up that appointment.  Physical therapy does not have any availability is until November.  He was already following with occupational medicine.      PMH:   No pertinent past medical history to this problem  MEDS:  Medications were reviewed in EMR  ALLERGIES:  Allergies were reviewed in EMR  FH:   No pertinent family history to this problem     Objective:     /70   Pulse 72   Temp 36.7 °C (98.1 °F) (Temporal)   Resp 16   Ht 1.88 m (6' 2\")   Wt 88.5 kg (195 lb)   SpO2 99%   BMI 25.04 kg/m²     Healing flaquito noted to his left lower back, this is tender to palpation.  Otherwise no discolorations or deformities noted to inspection of back.  There is still some swelling over his right olecranon.  Active ROM remains to right elbow.  Equal strength and sensation to lower extremities bilaterally.  Normal gait.    Assessment/Plan:     1. Injury of right elbow, subsequent encounter    2. Olecranon bursitis of right elbow    3. Lumbar contusion, subsequent encounter    Released to Restricted Duty FROM 10/9/2023 TO 11/6/2023     -Keep the same work restrictions of pushing and pulling 2 hours daily, maximum lifting 10 pounds  -Get appointment with physiatry  -Follow-up with occupational " medicine  -Tylenol and ibuprofen as needed for pain    Differential diagnosis, natural history, supportive care, and indications for immediate follow-up discussed.

## 2023-10-09 NOTE — LETTER
[facility]  [address] [facility] [place] [address]  Phone:  [phone] - Fax:  [phone]   [facility] Progress Report and Disability Certification  Date of Service: 10/9/2023   No Show:  No  Date / Time of Next Visit: 11/6/2023 with occ med   Claim Information   Patient Name: Izaiah Marin  Claim Number:     Employer: [employer]  Date of Injury: 8/17/2023     Insurer / TPA: [employer]  ID / SSN:     Occupation:   Diagnosis: Diagnoses of Injury of right elbow, subsequent encounter, Olecranon bursitis of right elbow, and Lumbar contusion, subsequent encounter were pertinent to this visit.    Medical Information   Related to Industrial Injury? Yes    Subjective Complaints:  DOI: 8/17/2023  DIANA: States he was working with a high-pressure hose when the hose blew and knocked the patient on the ground. Experiencing pain over the right elbow.  Describes a previous nonwork related injury to the right elbow 1 month ago.  No secondary employment.    Visit #4 today: Patient reports worsening in symptoms since prior visit, estimates he is 40% of his baseline.  Continues to experience left lower back pain and right elbow pain, currently rated 8/10.  He is using ibuprofen with moderate relief in symptoms.  Referral to physiatry has been approved, he is still trying to set up that appointment.  Physical therapy does not have any availability is until November.  He was already following with occupational medicine.     Objective Findings: Healing flaquito noted to his left lower back, this is tender to palpation.  Otherwise no discolorations or deformities noted to inspection of back.  There is still some swelling over his right olecranon.  Active ROM remains to right elbow.  Equal strength and sensation to lower extremities bilaterally.  Normal gait.   Pre-Existing Condition(s):     Assessment:   Condition Worsened    Status: Additional Care Required   Permanent Disability:No    Plan:      Diagnostics:      Comments:  -Keep the same work restrictions of pushing and pulling 2 hours daily, maximum lifting 10 pounds  -Get appointment with physiatry  -Follow-up with occupational medicine  -Tylenol and ibuprofen as needed for pain    Disability Information   Status: Released to Restricted Duty    From:  10/9/2023  Through: 11/6/2023 Restrictions are: Temporary   Physical Restrictions   Sitting:    Standing:    Stooping:    Bending:      Squatting:    Walking:    Climbing:    Pushing:  < or = to 2 hrs/day   Pulling:  < or = to 2 hrs/day Other:    Reaching Above Shoulder (L):   Reaching Above Shoulder (R):       Reaching Below Shoulder (L):    Reaching Below Shoulder (R):      Not to exceed Weight Limits   Carrying(hrs):   Weight Limit(lb): < or = to 10 pounds Lifting(hrs):   Weight  Limit(lb): < or = to 10 pounds   Comments:      Repetitive Actions   Hands: i.e. Fine Manipulations from Grasping:     Feet: i.e. Operating Foot Controls:     Driving / Operate Machinery:     Health Care Provider’s Original or Electronic Signature  Bertha Smith M.D. Health Care Provider’s Original or Electronic Signature    Clement Tavera DO MPH     Clinic Name / Location: [address] [address] Clinic Phone Number: Dept: [phone]   Appointment Time: 1:30 Pm Visit Start Time: 1:03 PM   Check-In Time:  12:59 Pm Visit Discharge Time:  1:30 PM   Original-Treating Physician or Chiropractor    Page 2-Insurer/TPA    Page 3-Employer    Page 4-Employee

## 2023-10-10 ENCOUNTER — APPOINTMENT (OUTPATIENT)
Dept: URGENT CARE | Facility: PHYSICIAN GROUP | Age: 38
End: 2023-10-10
Payer: COMMERCIAL

## 2023-10-17 ENCOUNTER — OCCUPATIONAL MEDICINE (OUTPATIENT)
Dept: URGENT CARE | Facility: PHYSICIAN GROUP | Age: 38
End: 2023-10-17
Payer: OTHER MISCELLANEOUS

## 2023-10-17 ENCOUNTER — HOSPITAL ENCOUNTER (OUTPATIENT)
Dept: RADIOLOGY | Facility: MEDICAL CENTER | Age: 38
End: 2023-10-17
Attending: STUDENT IN AN ORGANIZED HEALTH CARE EDUCATION/TRAINING PROGRAM
Payer: OTHER MISCELLANEOUS

## 2023-10-17 ENCOUNTER — APPOINTMENT (OUTPATIENT)
Dept: MEDICAL GROUP | Facility: PHYSICIAN GROUP | Age: 38
End: 2023-10-17
Payer: COMMERCIAL

## 2023-10-17 VITALS
HEIGHT: 74 IN | DIASTOLIC BLOOD PRESSURE: 80 MMHG | HEART RATE: 70 BPM | BODY MASS INDEX: 25.03 KG/M2 | RESPIRATION RATE: 18 BRPM | OXYGEN SATURATION: 96 % | WEIGHT: 195 LBS | TEMPERATURE: 99 F | SYSTOLIC BLOOD PRESSURE: 138 MMHG

## 2023-10-17 DIAGNOSIS — M54.42 ACUTE LEFT-SIDED LOW BACK PAIN WITH LEFT-SIDED SCIATICA: ICD-10-CM

## 2023-10-17 DIAGNOSIS — S59.901D INJURY OF RIGHT ELBOW, SUBSEQUENT ENCOUNTER: ICD-10-CM

## 2023-10-17 PROCEDURE — 3075F SYST BP GE 130 - 139MM HG: CPT | Performed by: STUDENT IN AN ORGANIZED HEALTH CARE EDUCATION/TRAINING PROGRAM

## 2023-10-17 PROCEDURE — 72100 X-RAY EXAM L-S SPINE 2/3 VWS: CPT

## 2023-10-17 PROCEDURE — 3079F DIAST BP 80-89 MM HG: CPT | Performed by: STUDENT IN AN ORGANIZED HEALTH CARE EDUCATION/TRAINING PROGRAM

## 2023-10-17 PROCEDURE — 99215 OFFICE O/P EST HI 40 MIN: CPT | Performed by: STUDENT IN AN ORGANIZED HEALTH CARE EDUCATION/TRAINING PROGRAM

## 2023-10-17 RX ORDER — LIDOCAINE 4 G/G
PATCH TOPICAL
Qty: 15 PATCH | Refills: 0 | Status: SHIPPED | OUTPATIENT
Start: 2023-10-17 | End: 2023-11-07

## 2023-10-17 NOTE — PROGRESS NOTES
"Physical Therapy Evaluation completed.   Bed Mobility:  Supine to Sit: Supervised  Transfers: Sit to Stand: CGA for safety and balance  Gait: Level Of Assist: Minimal Assist with Front-Wheel Walker       Plan of Care: Will benefit from Physical Therapy 5 times per week  Discharge Recommendations: Equipment: Will Continue to Assess for Equipment Needs. Post-acute therapy Discharge to home with outpatient and 24/ 7 assist and supervision as planned from Rehab.    See \"Rehab Therapy-Acute\" Patient Summary Report for complete documentation.     " Subjective:     Izaiah Marin is a 38 y.o. male who presents for Follow-Up (Workers Comp follow up- Back pain progressively worse, nausea)      DOI: 8/17/2023 DIANA: fire hose forcefully blew off of engine causing a fall. He struck his elbow directly to the ground.  Today patient states symptoms have remained unchanged especially in the elbow.  Pain is constant, 6-10/severity and sharp with use.  The pain is also deep inside the elbow, it is radiating at times.  He has noticed decreased function in the elbow.   has weakened significantly.  He has not had any relief from compression.  He does use blue emu ointment.  He has been having his lower back massage.  He states that he has pain around the scar on the low back that radiates down the leg slightly.  Area pointed out close to SI joint.  He denies leg weakness, bowel or bladder changes, saddle anesthesia, fever, chills, or penile/groin pain.  He notes the low back pain is triggered with sitting for long periods of time.  He has been on light duty at work.  MRI results reviewed with patient.  Physiatry referral placed for further evaluation management symptoms.  Patient is scheduled to start physical therapy in November recommend reaching out to see if sooner available appointments.  Plan of care discussed with patient.    Today's date: 10/17/2023.  Patient here for evaluation of worsening left lower back pain at site of impact from the hose.  Says pain is constant and has been getting worse.  Symptoms are aggravated with extended periods of sitting and standing.  Says he is experiencing intermittent numbness and tingling extending down his left leg and all the way up his left side into the back of his head.  Reports pain is sharp and occasionally radiates anteriorly towards the pelvis.  Also reports pain has been causing a headache and nausea in the morning.  No vomiting.  No abdominal pain.  Ibuprofen has provided limited relief of symptoms.  He went to  "see his chiropractor yesterday which is helped.  He does not like taking muscle relaxers due to the side effects.    PMH:   No pertinent past medical history to this problem  MEDS:  Medications were reviewed in EMR  ALLERGIES:  Allergies were reviewed in EMR  FH:   No pertinent family history to this problem       Objective:     /80 (BP Location: Left arm, Patient Position: Sitting, BP Cuff Size: Adult)   Pulse 70   Temp 37.2 °C (99 °F) (Temporal)   Resp 18   Ht 1.88 m (6' 2\")   Wt 88.5 kg (195 lb)   SpO2 96%   BMI 25.04 kg/m²     Gen: no acute distress, normal voice  Skin: dry, intact, moist mucosal membranes  Head: Atraumatic, normocephalic  Psych: normal affect, normal judgement, alert, awake  Musculoskeletal: Lumbar spine: Slow to get up from a seated position.  Quarter sized area of erythema at the level of the SI joint at location of impact from the hose with localized tenderness to palpation.  No crepitus or step-off.  No significant midline TTP.  Full range of motion associated with mild discernible discomfort with terminal flexion.  No motor or sensory deficits b/.  Equal patellar reflexes bilateral.  Neuro: Equal strength and sensation along L4-S1 bilateral. 1/4 patellar reflexes bilaterally.      RADIOLOGY RESULTS  DX-LUMBAR SPINE-2 OR 3 VIEWS    Result Date: 10/17/2023  10/17/2023 12:30 PM HISTORY/REASON FOR EXAM:  Pain Following Trauma. TECHNIQUE/ EXAM DESCRIPTION AND NUMBER OF VIEWS:  2 views of the lumbar spine. COMPARISON: 11/10/2022. FINDINGS: No acute fracture is detected. Preserved lumbar lordosis. Mild lumbar dextrocurvature. No significant lumbar spondylosis. Disc spaces are preserved. Minimal lower lumbar facet hypertrophy. No listhesis.     1.  No significant spondylosis. No acute fracture or listhesis. 2.  Mild lumbar dextrocurvature.    MR-ELBOW-W/O RIGHT    Result Date: 9/27/2023 9/27/2023 3:43 PM HISTORY/REASON FOR EXAM: Right elbow pain, injury TECHNIQUE/EXAM DESCRIPTION: " MRI of the RIGHT elbow without contrast. The study was performed on a [employer]a 1.5 Kala MRI scanner. T1 axial, T2 sagittal, T1 coronal, and T2 fat-suppressed axial, coronal, and sagittal sequences were obtained of the elbow. COMPARISON: None. FINDINGS: There is swelling/edema in the posterior subcutaneous tissues likely a subacute hematoma. BONES, BONE MARROW, CARTILAGE:  Within normal limits. TENDONS:  The biceps tendon, triceps tendon, common flexor tendon, and common extensor tendon appear normal. LIGAMENTS:The radial collateral and ulnar collateral ligament appear normal. MISCELLANEOUS:  There is no joint effusion.     1.  Dorsal cutaneous/subcutaneous edema which is likely a subacute hematoma 2.  Left elbow itself is normal in appearance    DX-ELBOW-COMPLETE 3+ RIGHT    Result Date: 9/18/2023 9/18/2023 3:06 PM HISTORY/REASON FOR EXAM:  Pain/Deformity Following Trauma. TECHNIQUE/EXAM DESCRIPTION AND NUMBER OF VIEWS:  3 views of the RIGHT elbow. COMPARISON: None FINDINGS: BONE MINERALIZATION: Normal. JOINTS: Preserved. No erosions. FRACTURE: None. DISLOCATION: None. SOFT TISSUES: No mass.     No acute osseous abnormality.                  Assessment/Plan:       1. Acute left-sided low back pain with left-sided sciatica  - DX-LUMBAR SPINE-2 OR 3 VIEWS; Future  - Lidocaine 4 % Patch; Apply patch to painful area. Patch may remain in place for up to 12 hours in any 24-hour period. No more than 1 patch can be used in a 24-hour period.  Dispense: 15 Patch; Refill: 0    2. Injury of right elbow, subsequent encounter    Released to Restricted Duty FROM 10/17/2023 TO 10/19/2023  X-rays negative for any acute osseous abnormalities.  Suspect pain secondary to bony contusion.  Exam was reassuring.  No red flags.  -Restrictions per D39 (4-hour limit to pushing, pulling, sitting, stooping, squatting + 10 pound weight limit)  -Ibuprofen and Tylenol for symptomatic relief  -Ordered topical lidocaine patch  -Follow-up with  occupational medicine at [address] [place] [address] for scheduled appt in 2 days.         43 minutes was spent on this encounter including review of multiple previous records, review of x-rays and MRI, face-to-face time, discussing the diagnosis, medical management, need for follow-up, completion of D39 and charting. This does not include time spent on separately billable procedures/tests.    Differential diagnosis, natural history, supportive care, and indications for immediate follow-up discussed.
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
## 2023-10-19 ENCOUNTER — OCCUPATIONAL MEDICINE (OUTPATIENT)
Dept: OCCUPATIONAL MEDICINE | Facility: CLINIC | Age: 38
End: 2023-10-19
Payer: OTHER MISCELLANEOUS

## 2023-10-19 VITALS
HEART RATE: 87 BPM | WEIGHT: 196 LBS | BODY MASS INDEX: 25.15 KG/M2 | SYSTOLIC BLOOD PRESSURE: 116 MMHG | TEMPERATURE: 98.7 F | OXYGEN SATURATION: 99 % | RESPIRATION RATE: 20 BRPM | DIASTOLIC BLOOD PRESSURE: 78 MMHG | HEIGHT: 74 IN

## 2023-10-19 DIAGNOSIS — S59.901D INJURY OF RIGHT ELBOW, SUBSEQUENT ENCOUNTER: ICD-10-CM

## 2023-10-19 DIAGNOSIS — S30.0XXD LUMBAR CONTUSION, SUBSEQUENT ENCOUNTER: ICD-10-CM

## 2023-10-19 DIAGNOSIS — M21.921 ACQUIRED DEFORMITY OF RIGHT ELBOW: ICD-10-CM

## 2023-10-19 PROCEDURE — 99213 OFFICE O/P EST LOW 20 MIN: CPT | Performed by: NURSE PRACTITIONER

## 2023-10-19 RX ORDER — ONDANSETRON 4 MG/1
4 TABLET, FILM COATED ORAL EVERY 4 HOURS PRN
Qty: 20 TABLET | Refills: 0 | Status: SHIPPED | OUTPATIENT
Start: 2023-10-19 | End: 2023-10-23

## 2023-10-19 ASSESSMENT — ENCOUNTER SYMPTOMS
MYALGIAS: 1
RESPIRATORY NEGATIVE: 1
WEAKNESS: 0
TINGLING: 1
HEADACHES: 1
CARDIOVASCULAR NEGATIVE: 1
BACK PAIN: 1
CONSTITUTIONAL NEGATIVE: 1
PSYCHIATRIC NEGATIVE: 1
SENSORY CHANGE: 0
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
## 2023-10-20 ENCOUNTER — APPOINTMENT (OUTPATIENT)
Dept: RADIOLOGY | Facility: MEDICAL CENTER | Age: 38
End: 2023-10-20
Attending: NURSE PRACTITIONER
Payer: OTHER MISCELLANEOUS

## 2023-10-20 DIAGNOSIS — S30.0XXD LUMBAR CONTUSION, SUBSEQUENT ENCOUNTER: ICD-10-CM

## 2023-10-20 PROCEDURE — 72148 MRI LUMBAR SPINE W/O DYE: CPT

## 2023-11-07 ENCOUNTER — OFFICE VISIT (OUTPATIENT)
Dept: MEDICAL GROUP | Facility: PHYSICIAN GROUP | Age: 38
End: 2023-11-07
Payer: COMMERCIAL

## 2023-11-07 VITALS
HEART RATE: 66 BPM | OXYGEN SATURATION: 100 % | WEIGHT: 194.6 LBS | RESPIRATION RATE: 16 BRPM | HEIGHT: 74 IN | DIASTOLIC BLOOD PRESSURE: 58 MMHG | BODY MASS INDEX: 24.97 KG/M2 | SYSTOLIC BLOOD PRESSURE: 102 MMHG | TEMPERATURE: 98.1 F

## 2023-11-07 DIAGNOSIS — Z00.00 WELL ADULT EXAM: ICD-10-CM

## 2023-11-07 DIAGNOSIS — Z23 NEED FOR VACCINATION: ICD-10-CM

## 2023-11-07 DIAGNOSIS — R73.9 HYPERGLYCEMIA: Chronic | ICD-10-CM

## 2023-11-07 DIAGNOSIS — F90.9 ATTENTION DEFICIT HYPERACTIVITY DISORDER (ADHD), UNSPECIFIED ADHD TYPE: ICD-10-CM

## 2023-11-07 DIAGNOSIS — E78.5 DYSLIPIDEMIA: Chronic | ICD-10-CM

## 2023-11-07 PROCEDURE — 3078F DIAST BP <80 MM HG: CPT | Performed by: INTERNAL MEDICINE

## 2023-11-07 PROCEDURE — 3074F SYST BP LT 130 MM HG: CPT | Performed by: INTERNAL MEDICINE

## 2023-11-07 PROCEDURE — 99214 OFFICE O/P EST MOD 30 MIN: CPT | Performed by: INTERNAL MEDICINE

## 2023-11-07 RX ORDER — DEXTROAMPHETAMINE SACCHARATE, AMPHETAMINE ASPARTATE, DEXTROAMPHETAMINE SULFATE AND AMPHETAMINE SULFATE 5; 5; 5; 5 MG/1; MG/1; MG/1; MG/1
20 TABLET ORAL DAILY
Qty: 30 TABLET | Refills: 0 | Status: SHIPPED | OUTPATIENT
Start: 2023-11-07 | End: 2023-12-04 | Stop reason: SDUPTHER

## 2023-11-08 NOTE — ASSESSMENT & PLAN NOTE
Chronic condition but the patient has been taking Adderall 20 Mg daily.  The patient tolerated medication well.  His symptoms are well controlled.  The patient request Rx refill.    Patient already signed the consent April 2023.  UDS ordered today    In prescribing controlled substances to this patient, I certify that I have obtained and reviewed the medical history of the patient. I have also made a good merced effort to obtain applicable records from other providers who have treated the patient.     I have reviewed patient's prescription history as maintained by the [place] Prescription Monitoring Program.      I have reviewed the medical records and have determined that a controlled substance treatment is medically indicated. I believe the benefits of controlled substance therapy outweigh the risks.      I have discussed the risks and benefits of treatment plan, and alternative therapies with the patient.  Also discussed with the patient regarding potential drug interactions with other medications.

## 2023-11-08 NOTE — PROGRESS NOTES
PRIMARY CARE CLINIC VISIT        Chief Complaint   Patient presents with    Follow-Up      Follow-up ADHD  Refill Adderall  Hyperlipidemia  Hyperglycemia  Vaccination status  Lab test request          History of Present Illness     ADHD  Chronic condition but the patient has been taking Adderall 20 Mg daily.  The patient tolerated medication well.  His symptoms are well controlled.  The patient request Rx refill.    Patient already signed the consent April 2023.  UDS ordered today    In prescribing controlled substances to this patient, I certify that I have obtained and reviewed the medical history of the patient. I have also made a good merced effort to obtain applicable records from other providers who have treated the patient.     I have reviewed patient's prescription history as maintained by the [place] Prescription Monitoring Program.      I have reviewed the medical records and have determined that a controlled substance treatment is medically indicated. I believe the benefits of controlled substance therapy outweigh the risks.      I have discussed the risks and benefits of treatment plan, and alternative therapies with the patient.  Also discussed with the patient regarding potential drug interactions with other medications.         Dyslipidemia  Chronic condition for the patient currently on diet therapy.  Lab test ordered for follow-up    Hyperglycemia  Chronic condition.  Patient currently on diet therapy.  Patient is due for lab test.    Need for vaccination  Recommended but the patient declined routine vaccination including influenza vaccine.    Current Outpatient Medications on File Prior to Visit   Medication Sig Dispense Refill    ibuprofen (MOTRIN) 800 MG Tab Take 1 Tablet by mouth every 8 hours as needed for Moderate Pain or Inflammation. 30 Tablet 0     No current facility-administered medications on file prior to visit.        Allergies: Patient has no known allergies.    Current Outpatient  "Medications Ordered in Epic   Medication Sig Dispense Refill    amphetamine-dextroamphetamine (ADDERALL) 20 MG Tab Take 1 Tablet by mouth every day for 30 days. 30 Tablet 0    ibuprofen (MOTRIN) 800 MG Tab Take 1 Tablet by mouth every 8 hours as needed for Moderate Pain or Inflammation. 30 Tablet 0     No current Epic-ordered facility-administered medications on file.       Past Medical History:   Diagnosis Date    ADD (attention deficit disorder)     Hx of Clostridium difficile infection        Past Surgical History:   Procedure Laterality Date    IRRIGATION & DEBRIDEMENT ORTHO Right 9/29/2015    Procedure: I & D RIGHT INDEX FINGER;  Surgeon: Neal Nolen M.D.;  Location: SURGERY [facility];  Service:        Family History   Problem Relation Age of Onset    Thyroid Mother     Alzheimer's Disease Father     ADD / ADHD Father     Asthma Brother     Alzheimer's Disease Maternal Grandmother     Arthritis Maternal Grandfather     Alzheimer's Disease Paternal Grandmother     No Known Problems Paternal Grandfather        Social History     Tobacco Use   Smoking Status Never   Smokeless Tobacco Never       Social History     Substance and Sexual Activity   Alcohol Use Not Currently    Alcohol/week: 2.4 oz    Types: 4 Glasses of wine per week       Review of systems.  As per HPI above. All other systems reviewed and negative.      Past Medical, Social, and Family history reviewed and updated in EPIC     Objective     /58 (BP Location: Left arm, Patient Position: Sitting, BP Cuff Size: Large adult)   Pulse 66   Temp 36.7 °C (98.1 °F) (Temporal)   Resp 16   Ht 1.88 m (6' 2\")   Wt 88.3 kg (194 lb 9.6 oz)   SpO2 100%    Body mass index is 24.99 kg/m².    General: alert in no apparent distress.  Cardiovascular: regular rate and rhythm  Pulmonary: lungs : no wheezing   Gastrointestinal: BS present.         Lab Results   Component Value Date/Time    HBA1C 5.5 10/26/2022 02:08 PM       Lab Results   Component " Value Date/Time    WBC 5.6 04/22/2021 09:40 AM    HEMOGLOBIN 15.8 04/22/2021 09:40 AM    HEMATOCRIT 46.9 04/22/2021 09:40 AM    MCV 89.2 04/22/2021 09:40 AM    PLATELETCT 275 04/22/2021 09:40 AM         Lab Results   Component Value Date/Time    SODIUM 138 10/26/2022 02:08 PM    POTASSIUM 4.6 10/26/2022 02:08 PM    GLUCOSE 103 (H) 10/26/2022 02:08 PM    BUN 15 10/26/2022 02:08 PM    CREATININE 0.98 10/26/2022 02:08 PM       Lab Results   Component Value Date/Time    CHOLSTRLTOT 184 10/26/2022 02:08 PM    TRIGLYCERIDE 62 10/26/2022 02:08 PM    HDL 57 10/26/2022 02:08 PM     (H) 10/26/2022 02:08 PM       Lab Results   Component Value Date/Time    ALTSGPT 22 04/22/2021 09:40 AM             Assessment and Plan     1. Attention deficit hyperactivity disorder (ADHD), unspecified ADHD type  - amphetamine-dextroamphetamine (ADDERALL) 20 MG Tab; Take 1 Tablet by mouth every day for 30 days.  Dispense: 30 Tablet; Refill: 0  - Pain Management Screen; Future  Chronic stable condition.  Continue Adderall 20 Mg daily.    2. Dyslipidemia  - Lipid Profile; Future  Chronic condition.  Current status unclear.  Lab test ordered for follow-up.  Recommend low-fat low-cholesterol diet.    3. Hyperglycemia  - Basic Metabolic Panel; Future  - HEMOGLOBIN A1C; Future  Chronic condition.  Current status unclear.  Blood test requested for follow-up.    4. Need for vaccination  Patient declined vaccination.    5. Well adult exam  - CBC WITHOUT DIFFERENTIAL; Future  - MICROALBUMIN CREAT RATIO URINE; Future                    Please note that this dictation was created using voice recognition software. I have made every reasonable attempt to correct obvious errors, but I expect that there are errors of grammar and possibly content that I did not discover before finalizing the note.    Geoffrey Cline MD  Internal Medicine  [facility]

## 2023-11-11 ENCOUNTER — HOSPITAL ENCOUNTER (OUTPATIENT)
Dept: LAB | Facility: MEDICAL CENTER | Age: 38
End: 2023-11-11
Attending: INTERNAL MEDICINE
Payer: COMMERCIAL

## 2023-11-11 DIAGNOSIS — E78.5 DYSLIPIDEMIA: Chronic | ICD-10-CM

## 2023-11-11 DIAGNOSIS — R73.9 HYPERGLYCEMIA: Chronic | ICD-10-CM

## 2023-11-11 DIAGNOSIS — F90.9 ATTENTION DEFICIT HYPERACTIVITY DISORDER (ADHD), UNSPECIFIED ADHD TYPE: ICD-10-CM

## 2023-11-11 DIAGNOSIS — Z00.00 WELL ADULT EXAM: ICD-10-CM

## 2023-11-11 LAB
ANION GAP SERPL CALC-SCNC: 7 MMOL/L (ref 7–16)
BUN SERPL-MCNC: 16 MG/DL (ref 8–22)
CALCIUM SERPL-MCNC: 9.2 MG/DL (ref 8.5–10.5)
CHLORIDE SERPL-SCNC: 104 MMOL/L (ref 96–112)
CHOLEST SERPL-MCNC: 189 MG/DL (ref 100–199)
CO2 SERPL-SCNC: 27 MMOL/L (ref 20–33)
CREAT SERPL-MCNC: 0.94 MG/DL (ref 0.5–1.4)
CREAT UR-MCNC: 102.47 MG/DL
ERYTHROCYTE [DISTWIDTH] IN BLOOD BY AUTOMATED COUNT: 46 FL (ref 35.9–50)
EST. AVERAGE GLUCOSE BLD GHB EST-MCNC: 126 MG/DL
FASTING STATUS PATIENT QL REPORTED: NORMAL
GFR SERPLBLD CREATININE-BSD FMLA CKD-EPI: 107 ML/MIN/1.73 M 2
GLUCOSE SERPL-MCNC: 96 MG/DL (ref 65–99)
HBA1C MFR BLD: 6 % (ref 4–5.6)
HCT VFR BLD AUTO: 45.1 % (ref 42–52)
HDLC SERPL-MCNC: 61 MG/DL
HGB BLD-MCNC: 15 G/DL (ref 14–18)
LDLC SERPL CALC-MCNC: 119 MG/DL
MCH RBC QN AUTO: 29.8 PG (ref 27–33)
MCHC RBC AUTO-ENTMCNC: 33.3 G/DL (ref 32.3–36.5)
MCV RBC AUTO: 89.5 FL (ref 81.4–97.8)
MICROALBUMIN UR-MCNC: <1.2 MG/DL
MICROALBUMIN/CREAT UR: NORMAL MG/G (ref 0–30)
PLATELET # BLD AUTO: 298 K/UL (ref 164–446)
PMV BLD AUTO: 10.9 FL (ref 9–12.9)
POTASSIUM SERPL-SCNC: 4.7 MMOL/L (ref 3.6–5.5)
RBC # BLD AUTO: 5.04 M/UL (ref 4.7–6.1)
SODIUM SERPL-SCNC: 138 MMOL/L (ref 135–145)
TRIGL SERPL-MCNC: 46 MG/DL (ref 0–149)
WBC # BLD AUTO: 6.5 K/UL (ref 4.8–10.8)

## 2023-11-11 PROCEDURE — G0481 DRUG TEST DEF 8-14 CLASSES: HCPCS

## 2023-11-11 PROCEDURE — 82043 UR ALBUMIN QUANTITATIVE: CPT

## 2023-11-11 PROCEDURE — 80061 LIPID PANEL: CPT

## 2023-11-11 PROCEDURE — 85027 COMPLETE CBC AUTOMATED: CPT

## 2023-11-11 PROCEDURE — 82570 ASSAY OF URINE CREATININE: CPT

## 2023-11-11 PROCEDURE — 80048 BASIC METABOLIC PNL TOTAL CA: CPT

## 2023-11-11 PROCEDURE — 83036 HEMOGLOBIN GLYCOSYLATED A1C: CPT

## 2023-11-11 PROCEDURE — 36415 COLL VENOUS BLD VENIPUNCTURE: CPT

## 2023-11-13 ENCOUNTER — TELEPHONE (OUTPATIENT)
Dept: MEDICAL GROUP | Facility: PHYSICIAN GROUP | Age: 38
End: 2023-11-13
Payer: COMMERCIAL

## 2023-11-13 NOTE — TELEPHONE ENCOUNTER
FINAL PRIOR AUTHORIZATION STATUS:    1.  Name of Medication & Dose: Adderall 20mg     2. Prior Auth Status: Approved through 11/12/24     3. Action Taken: Pharmacy Notified: yes Patient Notified: yes

## 2023-11-15 ENCOUNTER — APPOINTMENT (OUTPATIENT)
Dept: PHYSICAL THERAPY | Facility: REHABILITATION | Age: 38
End: 2023-11-15
Attending: FAMILY MEDICINE
Payer: OTHER MISCELLANEOUS

## 2023-11-17 ENCOUNTER — APPOINTMENT (OUTPATIENT)
Dept: PHYSICAL THERAPY | Facility: REHABILITATION | Age: 38
End: 2023-11-17
Attending: FAMILY MEDICINE
Payer: OTHER MISCELLANEOUS

## 2023-11-21 ENCOUNTER — APPOINTMENT (OUTPATIENT)
Dept: PHYSICAL THERAPY | Facility: REHABILITATION | Age: 38
End: 2023-11-21
Attending: FAMILY MEDICINE
Payer: OTHER MISCELLANEOUS

## 2023-11-21 LAB
1OH-MIDAZOLAM UR QL SCN: NOT DETECTED
6MAM UR QL: NOT DETECTED
7AMINOCLONAZEPAM UR QL: NOT DETECTED
A-OH ALPRAZ UR QL: NOT DETECTED
ALPRAZ UR QL: NOT DETECTED
AMPHET UR QL SCN: PRESENT
ANNOTATION COMMENT IMP: NORMAL
ANNOTATION COMMENT IMP: NORMAL
BARBITURATES UR QL: NEGATIVE
BUPRENORPHINE UR QL: NOT DETECTED
BZE UR QL: NEGATIVE
CARBOXYTHC UR QL: NEGATIVE
CARISOPRODOL UR QL: NEGATIVE
CLONAZEPAM UR QL: NOT DETECTED
CODEINE UR QL: NOT DETECTED
DIAZEPAM UR QL: NOT DETECTED
ETHYL GLUCURONIDE UR QL: NORMAL
FENTANYL UR QL: NOT DETECTED
GABAPENTIN UR QL CFM: NOT DETECTED
HYDROCODONE UR QL: NOT DETECTED
HYDROMORPHONE UR QL: NOT DETECTED
LORAZEPAM UR QL: NOT DETECTED
MDA UR QL: NOT DETECTED
MDEA UR QL: NOT DETECTED
MDMA UR QL: NOT DETECTED
ME-PHENIDATE UR QL: NOT DETECTED
METHADONE UR QL: NEGATIVE
METHAMPHET UR QL: NOT DETECTED
MIDAZOLAM UR QL SCN: NOT DETECTED
MORPHINE UR QL: NOT DETECTED
NALOXONE UR QL CFM: NOT DETECTED
NORBUPRENORPHINE UR QL CFM: NOT DETECTED
NORDIAZEPAM UR QL: NOT DETECTED
NORFENTANYL UR QL: NOT DETECTED
NORHYDROCODONE UR QL CFM: NOT DETECTED
NORMEPERIDINE UR QL CFM: NOT DETECTED
NOROXYCODONE UR QL CFM: NOT DETECTED
NOROXYMORPHONE UR QL SCN: NOT DETECTED
OXAZEPAM UR QL: NOT DETECTED
OXYCODONE UR QL: NOT DETECTED
OXYMORPHONE UR QL: NOT DETECTED
PATHOLOGY STUDY: NORMAL
PCP UR QL: NEGATIVE
PHENTERMINE UR QL: NOT DETECTED
PREGABALIN UR QL CFM: NOT DETECTED
SERVICE CMNT-IMP: NORMAL
TAPENTADOL UR QL SCN: NOT DETECTED
TAPENTADOL UR QL SCN: NOT DETECTED
TEMAZEPAM UR QL: NOT DETECTED
TRAMADOL UR QL: NEGATIVE
ZOLPIDEM PHENYL-4-CARB UR QL SCN: NOT DETECTED
ZOLPIDEM UR QL: NOT DETECTED

## 2023-11-27 ENCOUNTER — APPOINTMENT (OUTPATIENT)
Dept: PHYSICAL THERAPY | Facility: REHABILITATION | Age: 38
End: 2023-11-27
Attending: FAMILY MEDICINE
Payer: OTHER MISCELLANEOUS

## 2023-11-29 ENCOUNTER — APPOINTMENT (OUTPATIENT)
Dept: PHYSICAL THERAPY | Facility: REHABILITATION | Age: 38
End: 2023-11-29
Attending: FAMILY MEDICINE
Payer: OTHER MISCELLANEOUS

## 2023-12-04 ENCOUNTER — APPOINTMENT (OUTPATIENT)
Dept: PHYSICAL THERAPY | Facility: REHABILITATION | Age: 38
End: 2023-12-04
Attending: FAMILY MEDICINE
Payer: OTHER MISCELLANEOUS

## 2023-12-04 DIAGNOSIS — F90.9 ATTENTION DEFICIT HYPERACTIVITY DISORDER (ADHD), UNSPECIFIED ADHD TYPE: ICD-10-CM

## 2023-12-04 RX ORDER — DEXTROAMPHETAMINE SACCHARATE, AMPHETAMINE ASPARTATE, DEXTROAMPHETAMINE SULFATE AND AMPHETAMINE SULFATE 5; 5; 5; 5 MG/1; MG/1; MG/1; MG/1
20 TABLET ORAL DAILY
Qty: 30 TABLET | Refills: 0 | Status: SHIPPED | OUTPATIENT
Start: 2023-12-04 | End: 2024-03-13 | Stop reason: SDUPTHER

## 2023-12-06 ENCOUNTER — APPOINTMENT (OUTPATIENT)
Dept: PHYSICAL THERAPY | Facility: REHABILITATION | Age: 38
End: 2023-12-06
Attending: FAMILY MEDICINE
Payer: OTHER MISCELLANEOUS

## 2023-12-11 ENCOUNTER — HOSPITAL ENCOUNTER (OUTPATIENT)
Dept: LAB | Facility: MEDICAL CENTER | Age: 38
End: 2023-12-11
Attending: INTERNAL MEDICINE
Payer: COMMERCIAL

## 2023-12-11 DIAGNOSIS — Z11.1 SCREENING-PULMONARY TB: ICD-10-CM

## 2023-12-11 PROCEDURE — 86480 TB TEST CELL IMMUN MEASURE: CPT

## 2023-12-11 PROCEDURE — 36415 COLL VENOUS BLD VENIPUNCTURE: CPT

## 2023-12-13 LAB
GAMMA INTERFERON BACKGROUND BLD IA-ACNC: 0.03 IU/ML
M TB IFN-G BLD-IMP: NEGATIVE
M TB IFN-G CD4+ BCKGRND COR BLD-ACNC: 0 IU/ML
MITOGEN IGNF BCKGRD COR BLD-ACNC: 3.28 IU/ML
QFT TB2 - NIL TBQ2: 0 IU/ML

## 2024-01-10 DIAGNOSIS — Z91.048 ALLERGY TO MOLD: ICD-10-CM

## 2024-01-10 DIAGNOSIS — T78.40XD ALLERGY, SUBSEQUENT ENCOUNTER: ICD-10-CM

## 2024-01-13 ENCOUNTER — HOSPITAL ENCOUNTER (OUTPATIENT)
Dept: LAB | Facility: MEDICAL CENTER | Age: 39
End: 2024-01-13
Attending: INTERNAL MEDICINE
Payer: COMMERCIAL

## 2024-01-13 DIAGNOSIS — R73.9 HYPERGLYCEMIA: ICD-10-CM

## 2024-01-13 DIAGNOSIS — E78.5 DYSLIPIDEMIA: Chronic | ICD-10-CM

## 2024-01-13 LAB
ANION GAP SERPL CALC-SCNC: 11 MMOL/L (ref 7–16)
BUN SERPL-MCNC: 14 MG/DL (ref 8–22)
CALCIUM SERPL-MCNC: 9.3 MG/DL (ref 8.5–10.5)
CHLORIDE SERPL-SCNC: 102 MMOL/L (ref 96–112)
CHOLEST SERPL-MCNC: 226 MG/DL (ref 100–199)
CO2 SERPL-SCNC: 26 MMOL/L (ref 20–33)
CREAT SERPL-MCNC: 0.85 MG/DL (ref 0.5–1.4)
EST. AVERAGE GLUCOSE BLD GHB EST-MCNC: 105 MG/DL
FASTING STATUS PATIENT QL REPORTED: NORMAL
GFR SERPLBLD CREATININE-BSD FMLA CKD-EPI: 115 ML/MIN/1.73 M 2
GLUCOSE SERPL-MCNC: 105 MG/DL (ref 65–99)
HBA1C MFR BLD: 5.3 % (ref 4–5.6)
HDLC SERPL-MCNC: 66 MG/DL
LDLC SERPL CALC-MCNC: 152 MG/DL
POTASSIUM SERPL-SCNC: 4.7 MMOL/L (ref 3.6–5.5)
SODIUM SERPL-SCNC: 139 MMOL/L (ref 135–145)
TRIGL SERPL-MCNC: 41 MG/DL (ref 0–149)

## 2024-01-13 PROCEDURE — 80048 BASIC METABOLIC PNL TOTAL CA: CPT

## 2024-01-13 PROCEDURE — 80061 LIPID PANEL: CPT

## 2024-01-13 PROCEDURE — 86003 ALLG SPEC IGE CRUDE XTRC EA: CPT | Mod: 91

## 2024-01-13 PROCEDURE — 36415 COLL VENOUS BLD VENIPUNCTURE: CPT

## 2024-01-13 PROCEDURE — 83036 HEMOGLOBIN GLYCOSYLATED A1C: CPT

## 2024-01-14 DIAGNOSIS — Z77.120 MOLD EXPOSURE: ICD-10-CM

## 2024-01-15 PROBLEM — R73.03 PREDIABETES: Chronic | Status: ACTIVE | Noted: 2023-04-18

## 2024-01-15 PROBLEM — R73.03 PREDIABETES: Status: ACTIVE | Noted: 2023-04-18

## 2024-01-16 LAB
A ALTERNATA IGE QN: <0.1 KU/L
A FUMIGATUS IGE QN: <0.1 KU/L
C ALBICANS IGE QN: <0.1 KU/L
C SPHAEROSPERMUM IGE QN: <0.1 KU/L
DEPRECATED MISC ALLERGEN IGE RAST QL: NORMAL
P NOTATUM IGE QN: <0.1 KU/L

## 2024-03-11 ENCOUNTER — PATIENT MESSAGE (OUTPATIENT)
Dept: MEDICAL GROUP | Facility: PHYSICIAN GROUP | Age: 39
End: 2024-03-11
Payer: COMMERCIAL

## 2024-03-11 DIAGNOSIS — F90.9 ATTENTION DEFICIT HYPERACTIVITY DISORDER (ADHD), UNSPECIFIED ADHD TYPE: ICD-10-CM

## 2024-03-13 RX ORDER — DEXTROAMPHETAMINE SACCHARATE, AMPHETAMINE ASPARTATE, DEXTROAMPHETAMINE SULFATE AND AMPHETAMINE SULFATE 5; 5; 5; 5 MG/1; MG/1; MG/1; MG/1
20 TABLET ORAL DAILY
Qty: 30 TABLET | Refills: 0 | Status: SHIPPED | OUTPATIENT
Start: 2024-03-13 | End: 2024-04-12

## 2024-03-13 NOTE — PATIENT COMMUNICATION
Received request via: Patient    Was the patient seen in the last year in this department? Yes    Does the patient have an active prescription (recently filled or refills available) for medication(s) requested? No    Pharmacy Name: [facility]    Does the patient have USP Plus and need 100 day supply (blood pressure, diabetes and cholesterol meds only)? Patient does not have SCP

## 2024-05-23 ENCOUNTER — OFFICE VISIT (OUTPATIENT)
Dept: MEDICAL GROUP | Facility: PHYSICIAN GROUP | Age: 39
End: 2024-05-23
Payer: COMMERCIAL

## 2024-05-23 ENCOUNTER — HOSPITAL ENCOUNTER (OUTPATIENT)
Facility: MEDICAL CENTER | Age: 39
End: 2024-05-23
Attending: INTERNAL MEDICINE
Payer: COMMERCIAL

## 2024-05-23 VITALS
HEIGHT: 74 IN | SYSTOLIC BLOOD PRESSURE: 110 MMHG | HEART RATE: 80 BPM | WEIGHT: 201.9 LBS | DIASTOLIC BLOOD PRESSURE: 72 MMHG | OXYGEN SATURATION: 100 % | TEMPERATURE: 99.3 F | BODY MASS INDEX: 25.91 KG/M2

## 2024-05-23 DIAGNOSIS — Z51.81 THERAPEUTIC DRUG MONITORING: ICD-10-CM

## 2024-05-23 DIAGNOSIS — R09.81 SINUS CONGESTION: ICD-10-CM

## 2024-05-23 DIAGNOSIS — F90.9 ATTENTION DEFICIT HYPERACTIVITY DISORDER (ADHD), UNSPECIFIED ADHD TYPE: ICD-10-CM

## 2024-05-23 DIAGNOSIS — R73.03 PREDIABETES: Chronic | ICD-10-CM

## 2024-05-23 DIAGNOSIS — E78.5 DYSLIPIDEMIA: Chronic | ICD-10-CM

## 2024-05-23 PROCEDURE — 3074F SYST BP LT 130 MM HG: CPT | Performed by: INTERNAL MEDICINE

## 2024-05-23 PROCEDURE — 3078F DIAST BP <80 MM HG: CPT | Performed by: INTERNAL MEDICINE

## 2024-05-23 PROCEDURE — 99214 OFFICE O/P EST MOD 30 MIN: CPT | Performed by: INTERNAL MEDICINE

## 2024-05-23 RX ORDER — AZELASTINE 1 MG/ML
1 SPRAY, METERED NASAL 2 TIMES DAILY
Qty: 30 ML | Refills: 3 | Status: SHIPPED | OUTPATIENT
Start: 2024-05-23

## 2024-05-23 RX ORDER — DEXTROAMPHETAMINE SACCHARATE, AMPHETAMINE ASPARTATE, DEXTROAMPHETAMINE SULFATE AND AMPHETAMINE SULFATE 5; 5; 5; 5 MG/1; MG/1; MG/1; MG/1
20 TABLET ORAL 2 TIMES DAILY
Qty: 60 TABLET | Refills: 0 | Status: SHIPPED | OUTPATIENT
Start: 2024-05-23 | End: 2024-06-22

## 2024-05-23 RX ORDER — DEXTROAMPHETAMINE SACCHARATE, AMPHETAMINE ASPARTATE, DEXTROAMPHETAMINE SULFATE AND AMPHETAMINE SULFATE 5; 5; 5; 5 MG/1; MG/1; MG/1; MG/1
20 TABLET ORAL 2 TIMES DAILY
COMMUNITY
End: 2024-05-23 | Stop reason: SDUPTHER

## 2024-05-23 ASSESSMENT — PATIENT HEALTH QUESTIONNAIRE - PHQ9: CLINICAL INTERPRETATION OF PHQ2 SCORE: 0

## 2024-05-23 NOTE — PROGRESS NOTES
PRIMARY CARE CLINIC VISIT        Chief Complaint   Patient presents with    Follow-Up     Check up       Follow-up attention deficit disorder  Rx refill  Sinus congestion  Hyperlipidemia  Prediabetes  Test results  Medication review        History of Present Illness     ADHD  Chronic condition.  The patient presently taking Adderall 20 Mg twice daily.  Patient tolerating medication well.  Symptoms are well-controlled.    Patient is due to signed a consent form and UDS ordered.    Dyslipidemia  Chronic condition.  The patient currently on diet therapy.  Recent lipid panel result discussed with the patient.    Prediabetes  Chronic condition.  Lab test result discussed with the patient.  Patient asymptomatic.  Recommended diet and lifestyle modification.    Sinus congestion  Acute on chronic condition.  The patient reported recurrent sinus pressure and congestion   Patient reported symptoms  gotten worse in the last few weeks.  Patient denies fever or chills.  The patient has tried Flonase without significant improvement.  Request ENT referral.    No current outpatient medications on file prior to visit.     No current facility-administered medications on file prior to visit.        Allergies: Patient has no known allergies.    Current Outpatient Medications Ordered in Epic   Medication Sig Dispense Refill    amphetamine-dextroamphetamine (ADDERALL, 20MG,) 20 MG Tab Take 1 Tablet by mouth 2 times a day for 30 days. 60 Tablet 0    azelastine (ASTELIN) 137 MCG/SPRAY nasal spray Administer 1 Spray into affected nostril(S) 2 times a day. 30 mL 3     No current [facility]-ordered facility-administered medications on file.       Past Medical History:   Diagnosis Date    ADD (attention deficit disorder)     Hx of Clostridium difficile infection        Past Surgical History:   Procedure Laterality Date    IRRIGATION & DEBRIDEMENT ORTHO Right 9/29/2015    Procedure: I & D RIGHT INDEX FINGER;  Surgeon: Neal Nolen M.D.;   "Location: SURGERY [facility];  Service:        Family History   Problem Relation Age of Onset    Thyroid Mother     Alzheimer's Disease Father     ADD / ADHD Father     Asthma Brother     Alzheimer's Disease Maternal Grandmother     Arthritis Maternal Grandfather     Alzheimer's Disease Paternal Grandmother     No Known Problems Paternal Grandfather        Social History     Tobacco Use   Smoking Status Never   Smokeless Tobacco Never       Social History     Substance and Sexual Activity   Alcohol Use Not Currently    Alcohol/week: 2.4 oz    Types: 4 Glasses of wine per week       Review of systems  As per HPI above. All other systems reviewed and negative.      Past Medical, Social, and Family history reviewed and updated in [facility]       LAB DATA:    Lab Results   Component Value Date/Time    HBA1C 5.3 01/13/2024 09:09 AM    HBA1C 6.0 (H) 11/11/2023 10:23 AM    HBA1C 5.5 10/26/2022 02:08 PM       Lab Results   Component Value Date/Time    WBC 6.5 11/11/2023 10:23 AM    HEMOGLOBIN 15.0 11/11/2023 10:23 AM    HEMATOCRIT 45.1 11/11/2023 10:23 AM    MCV 89.5 11/11/2023 10:23 AM    PLATELETCT 298 11/11/2023 10:23 AM       Lab Results   Component Value Date/Time    SODIUM 139 01/13/2024 09:09 AM    POTASSIUM 4.7 01/13/2024 09:09 AM    GLUCOSE 105 (H) 01/13/2024 09:09 AM    BUN 14 01/13/2024 09:09 AM    CREATININE 0.85 01/13/2024 09:09 AM       Lab Results   Component Value Date/Time    CHOLSTRLTOT 226 (H) 01/13/2024 09:09 AM    TRIGLYCERIDE 41 01/13/2024 09:09 AM    HDL 66 01/13/2024 09:09 AM     (H) 01/13/2024 09:09 AM       Lab Results   Component Value Date/Time    ALTSGPT 22 04/22/2021 09:40 AM          Objective     /72 (BP Location: Right arm, Patient Position: Sitting, BP Cuff Size: Adult)   Pulse 80   Temp 37.4 °C (99.3 °F) (Temporal)   Ht 1.88 m (6' 2\")   Wt 91.6 kg (201 lb 14.4 oz)   SpO2 100%    Body mass index is 25.92 kg/m².    General: alert in no apparent distress.  Cardiovascular: regular " rate and rhythm  Pulmonary: lungs : no wheezing   Gastrointestinal: BS present.   Sinus with moderate mucosal formation.  No purulent drainage  Oropharynx no exudate    Assessment and Plan     1. Sinus congestion  This is an acute on chronic condition.  The symptoms have gotten worse in the last few weeks.  No obvious signs of infection noted on exam.  Could be due to allergies  A trial of Astelin nasal spray recommended.  Also refer the patient to ENT for further evaluation.  - Referral to ENT  - azelastine (ASTELIN) 137 MCG/SPRAY nasal spray; Administer 1 Spray into affected nostril(S) 2 times a day.  Dispense: 30 mL; Refill: 3    2. Attention deficit hyperactivity disorder (ADHD), unspecified ADHD type    Chronic stable condition.  Refill Adderall 20 Mg twice daily.  Potential side effect of medication discussed with the patient.    In prescribing controlled substances to this patient, I certify that I have obtained and reviewed the medical history of the patient. I have also made a good merced effort to obtain applicable records from other providers who have treated the patient.     I have reviewed patient's prescription history as maintained by the [place] Prescription Monitoring Program.      I have assessed the patient’s risk for abuse, dependency, and addiction using the validated Opioid Risk Tool     Given the above, I believe the benefits of controlled substance therapy outweigh the risks. The reasons for prescribing controlled substances include my professional opinion that controlled substances are a reasonable choice for this patient in the event other methods are ineffective inadequately controlling pain. Accordingly, I have discussed the risks and benefits of opioid therapy, treatment plan, and alternative therapies with the patient.     Verbal informed consent was provided. Counseled pt on risks, benefits, and potential side effects of controlled substance treatment including but not limited to  sedation, alcohol, driving, urine drug screens, tolerance, addiction, impaired judgement, and the risk of fatal overdose if not taken as prescribed; increased risk of overdose/death ; and proper storage and disposal of controlled substances.     Advised pt to take pain med only as needed as prescribed for severe pain /and not to exceed the prescribed amount.  -Explained to pt that goals of treatment plan are to 1)improve function 2)reduce pain level if possible 3)develop self management skills for controlling pain.    -Pt advised to go to nearest ER if any of the following symptoms develop: motor weakness below baseline, respiratory depression, or mental status changes, intolerable side effects, bowel/bladder incontinence, or severe exacerbation of pain.    -UDS ordered     -Consent form signed by pt        - Consent for Opiate Prescription  - amphetamine-dextroamphetamine (ADDERALL, 20MG,) 20 MG Tab; Take 1 Tablet by mouth 2 times a day for 30 days.  Dispense: 60 Tablet; Refill: 0    3. Dyslipidemia  Chronic condition.  Uncontrolled.  Lab test result show patient with mildly elevated cholesterol panel.  Recommend diet and exercise.  Repeat lab test approximately 3 months  - ALANINE AMINO-TRANS; Future  - Lipid Profile; Future    4. Prediabetes  This is a chronic condition.  Recent lab test result discussed with the patient.  Recommend diet and exercise.  Repeat lab test approximately 3 months.  - HEMOGLOBIN A1C; Future  - Basic Metabolic Panel; Future    5. Therapeutic drug monitoring  - PAIN MANAGEMENT PANEL, SCRN W/ RFLX TO QNT; Future            Attestation: I spent:   32  minutes -    That includes time for chart review before the visit, the actual patient visit, and time spent on documentation in EMR after the visit.  Chart review/prep, review of other providers' records, imaging/lab review, face-to-face time for history/examination, pt's counseling/education, ordering, prescribing,  review of results/meds/  treatment plan with patient, and care coordination.           Healthcare Maintenance       Health Maintenance Due   Topic Date Due    Chickenpox Vaccine (Varicella) (1 of 2 - 13+ 2-dose series) Never done    COVID-19 Vaccine (4 - 2023-24 season) 09/01/2023               Please note that this dictation was created using voice recognition software. I have made every reasonable attempt to correct obvious errors, but I expect that there are errors of grammar and possibly content that I did not discover before finalizing the note.    Geoffrey Cline MD  Internal Medicine  [facility]

## 2024-05-23 NOTE — ASSESSMENT & PLAN NOTE
Chronic condition.  The patient currently on diet therapy.  Recent lipid panel result discussed with the patient.

## 2024-05-23 NOTE — ASSESSMENT & PLAN NOTE
Chronic condition.  The patient presently taking Adderall 20 Mg twice daily.  Patient tolerating medication well.  Symptoms are well-controlled.    Patient is due to signed a consent form and UDS ordered.

## 2024-05-23 NOTE — ASSESSMENT & PLAN NOTE
Acute on chronic condition.  The patient reported recurrent sinus pressure and congestion   Patient reported symptoms  gotten worse in the last few weeks.  Patient denies fever or chills.  The patient has tried Flonase without significant improvement.  Request ENT referral.

## 2024-05-23 NOTE — ASSESSMENT & PLAN NOTE
Chronic condition.  Lab test result discussed with the patient.  Patient asymptomatic.  Recommended diet and lifestyle modification.

## 2024-05-25 LAB
AMPHET CTO UR CFM-MCNC: NORMAL NG/ML
BARBITURATES CTO UR CFM-MCNC: NEGATIVE NG/ML
BENZODIAZ CTO UR CFM-MCNC: NEGATIVE NG/ML
BUPRENORPHINE UR-MCNC: NEGATIVE NG/ML
CANNABINOIDS CTO UR CFM-MCNC: NEGATIVE NG/ML
CARISOPRODOL UR-MCNC: NEGATIVE NG/ML
COCAINE CTO UR CFM-MCNC: NEGATIVE NG/ML
CREAT UR-MCNC: 108.8 MG/DL (ref 20–400)
DRUG SCREEN COMMENT UR-IMP: NORMAL
ETHYL GLUCURONIDE UR QL SCN: NEGATIVE NG/ML
FENTANYL UR-MCNC: NEGATIVE NG/ML
MEPERIDINE CTO UR SCN-MCNC: NEGATIVE NG/ML
METHADONE CTO UR CFM-MCNC: NEGATIVE NG/ML
OPIATES UR QL SCN: NEGATIVE NG/ML
OXYCDOXYM URSCRN 2005102: NEGATIVE NG/ML
PCP CTO UR CFM-MCNC: NEGATIVE NG/ML
PROPOXYPH CTO UR CFM-MCNC: NEGATIVE NG/ML
TAPENTADOL UR-MCNC: NEGATIVE NG/ML
TRAMADOL CTO UR SCN-MCNC: NEGATIVE NG/ML
ZOLPIDEM UR-MCNC: NEGATIVE NG/ML

## 2024-05-27 LAB
AMPHET UR CFM-MCNC: 1797 NG/ML
MDA UR CFM-MCNC: <200 NG/ML
MDEA UR CFM-MCNC: <200 NG/ML
MDMA UR CFM-MCNC: <200 NG/ML
METHAMPHET UR CFM-MCNC: <200 NG/ML
PHENTERMINE UR CFM-MCNC: <200 NG/ML

## 2024-08-08 ENCOUNTER — APPOINTMENT (OUTPATIENT)
Dept: MEDICAL GROUP | Facility: PHYSICIAN GROUP | Age: 39
End: 2024-08-08
Payer: COMMERCIAL

## 2024-08-13 DIAGNOSIS — F90.9 ATTENTION DEFICIT HYPERACTIVITY DISORDER (ADHD), UNSPECIFIED ADHD TYPE: ICD-10-CM

## 2024-08-13 RX ORDER — DEXTROAMPHETAMINE SACCHARATE, AMPHETAMINE ASPARTATE, DEXTROAMPHETAMINE SULFATE AND AMPHETAMINE SULFATE 5; 5; 5; 5 MG/1; MG/1; MG/1; MG/1
20 TABLET ORAL 2 TIMES DAILY
Qty: 60 TABLET | Refills: 0 | Status: SHIPPED | OUTPATIENT
Start: 2024-08-13 | End: 2024-08-29 | Stop reason: SDUPTHER

## 2024-08-13 NOTE — TELEPHONE ENCOUNTER
Received request via: Patient    Was the patient seen in the last year in this department? Yes    Does the patient have an active prescription (recently filled or refills available) for medication(s) requested? No    Pharmacy Name: [facility]    Does the patient have half-way Plus and need 100-day supply? (This applies to ALL medications) Patient does not have SCP

## 2024-08-28 ENCOUNTER — APPOINTMENT (OUTPATIENT)
Dept: MEDICAL GROUP | Facility: PHYSICIAN GROUP | Age: 39
End: 2024-08-28
Payer: COMMERCIAL

## 2024-08-29 ENCOUNTER — OFFICE VISIT (OUTPATIENT)
Dept: MEDICAL GROUP | Facility: PHYSICIAN GROUP | Age: 39
End: 2024-08-29
Payer: COMMERCIAL

## 2024-08-29 VITALS
HEIGHT: 74 IN | BODY MASS INDEX: 26.1 KG/M2 | SYSTOLIC BLOOD PRESSURE: 104 MMHG | HEART RATE: 66 BPM | WEIGHT: 203.4 LBS | TEMPERATURE: 97.5 F | OXYGEN SATURATION: 98 % | DIASTOLIC BLOOD PRESSURE: 60 MMHG | RESPIRATION RATE: 16 BRPM

## 2024-08-29 DIAGNOSIS — F90.9 ATTENTION DEFICIT HYPERACTIVITY DISORDER (ADHD), UNSPECIFIED ADHD TYPE: ICD-10-CM

## 2024-08-29 DIAGNOSIS — R13.10 DYSPHAGIA, UNSPECIFIED TYPE: ICD-10-CM

## 2024-08-29 DIAGNOSIS — J30.1 ALLERGIC RHINITIS DUE TO POLLEN, UNSPECIFIED SEASONALITY: ICD-10-CM

## 2024-08-29 DIAGNOSIS — R73.03 PREDIABETES: Chronic | ICD-10-CM

## 2024-08-29 DIAGNOSIS — E55.9 VITAMIN D DEFICIENCY: ICD-10-CM

## 2024-08-29 DIAGNOSIS — Z51.81 THERAPEUTIC DRUG MONITORING: ICD-10-CM

## 2024-08-29 DIAGNOSIS — R53.82 CHRONIC FATIGUE: ICD-10-CM

## 2024-08-29 DIAGNOSIS — E78.5 DYSLIPIDEMIA: Chronic | ICD-10-CM

## 2024-08-29 PROBLEM — R09.81 SINUS CONGESTION: Status: RESOLVED | Noted: 2024-05-23 | Resolved: 2024-08-29

## 2024-08-29 PROBLEM — J30.9 ALLERGIC RHINITIS: Status: ACTIVE | Noted: 2024-08-29

## 2024-08-29 PROBLEM — J30.9 ALLERGIC RHINITIS: Chronic | Status: ACTIVE | Noted: 2024-08-29

## 2024-08-29 RX ORDER — DEXTROAMPHETAMINE SACCHARATE, AMPHETAMINE ASPARTATE, DEXTROAMPHETAMINE SULFATE AND AMPHETAMINE SULFATE 5; 5; 5; 5 MG/1; MG/1; MG/1; MG/1
20 TABLET ORAL 2 TIMES DAILY
Qty: 60 TABLET | Refills: 0 | Status: SHIPPED | OUTPATIENT
Start: 2024-09-13 | End: 2024-10-13

## 2024-08-29 NOTE — ASSESSMENT & PLAN NOTE
This is a chronic condition.  Patient has been feeling fatigue since the last several months.  Patient reported he has been working long hours.  However the patient wishes to get lab test to make sure nothing concerning

## 2024-08-29 NOTE — ASSESSMENT & PLAN NOTE
Chronic condition.  The patient has been using Astelin nasal spray.  The patient denies fever chills shortness of breath or wheezing.

## 2024-08-29 NOTE — ASSESSMENT & PLAN NOTE
This is a chronic condition  Previous lab test result discussed with the patient.  Currently the patient on diet therapy..  Blood test requested to check A1c.

## 2024-08-29 NOTE — ASSESSMENT & PLAN NOTE
Chronic condition.  Patient has been taking Adderall.  Patient tolerating medication well.  No significant side effects reported.  Patient signed consent form May 2024.  The patient reports symptoms are well-controlled with the treatment.  Patient is questing Rx refill.    In prescribing controlled substances to this patient, I certify that I have obtained and reviewed the medical history of the patient. I have also made a good merced effort to obtain applicable records from other providers who have treated the patient.     I have reviewed patient's prescription history as maintained by the [place] Prescription Monitoring Program.      I have reviewed the medical records and have determined that a controlled substance treatment is medically indicated. I believe the benefits of controlled substance therapy outweigh the risks.      I have discussed the risks and benefits of treatment plan, and alternative therapies with the patient.  Also discussed with the patient regarding potential drug interactions with other medications.

## 2024-08-29 NOTE — ASSESSMENT & PLAN NOTE
Chronic condition.  The patient presently taking vitamin D supplementation.  Patient does not know the exact dose.  Lab test ordered for follow-up.

## 2024-08-29 NOTE — PROGRESS NOTES
PRIMARY CARE CLINIC VISIT        Chief Complaint   Patient presents with    Requesting Labs    Seasonal Allergies      Allergies   dysphagia  ADHD  Rx refill  Hyperlipidemia  Vitamin D deficiency  Dysphagia  Prediabetes  Hyperlipidemia  Chronic fatigue  Wants to check for testicular cancer      History of Present Illness     ADHD  Chronic condition.  Patient has been taking Adderall.  Patient tolerating medication well.  No significant side effects reported.  Patient signed consent form May 2024.  The patient reports symptoms are well-controlled with the treatment.  Patient is questing Rx refill.    In prescribing controlled substances to this patient, I certify that I have obtained and reviewed the medical history of the patient. I have also made a good merced effort to obtain applicable records from other providers who have treated the patient.     I have reviewed patient's prescription history as maintained by the [place] Prescription Monitoring Program.      I have reviewed the medical records and have determined that a controlled substance treatment is medically indicated. I believe the benefits of controlled substance therapy outweigh the risks.      I have discussed the risks and benefits of treatment plan, and alternative therapies     The patient has done well with this medication.  Patient wishes to continue with the same treatment.  Rx sent to the pharmacy today.    Previous consent form signed by the patient in May 2024.  UDS requested today.    Dyslipidemia  Chronic condition    the patient currently on diet therapy.  Test ordered for follow-up.    Prediabetes  This is a chronic condition  Previous lab test result discussed with the patient.  Currently the patient on diet therapy..  Blood test requested to check A1c.    Allergic rhinitis  Chronic condition.  The patient has been using Astelin nasal spray.  The patient denies fever chills shortness of breath or wheezing.    Vitamin D deficiency  Chronic  condition.  The patient presently taking vitamin D supplementation.  Patient does not know the exact dose.  Lab test ordered for follow-up.    Dysphagia  This is a new condition.  The patient reported some discomfort in the epigastric region after eating/swallowing.  He denies fever or chills.  Patient also stated that he has been working out on regular basis.  Currently the patient asymptomatic.    Chronic fatigue  This is a chronic condition.  Patient has been feeling fatigue since the last several months.  Patient reported he has been working long hours.  However the patient wishes to get lab test to make sure nothing concerning    Patient also wishes to check for possible testicular cancer.  The patient asymptomatic.    Current Outpatient Medications on File Prior to Visit   Medication Sig Dispense Refill    azelastine (ASTELIN) 137 MCG/SPRAY nasal spray Administer 1 Spray into affected nostril(S) 2 times a day. 30 mL 3     No current facility-administered medications on file prior to visit.        Allergies: Patient has no known allergies.    Current Outpatient Medications Ordered in Epic   Medication Sig Dispense Refill    [START ON 9/13/2024] amphetamine-dextroamphetamine (ADDERALL, 20MG,) 20 MG Tab Take 1 Tablet by mouth 2 times a day for 30 days. 60 Tablet 0    azelastine (ASTELIN) 137 MCG/SPRAY nasal spray Administer 1 Spray into affected nostril(S) 2 times a day. 30 mL 3     No current [facility]-ordered facility-administered medications on file.       Past Medical History:   Diagnosis Date    ADD (attention deficit disorder)     Hx of Clostridium difficile infection        Past Surgical History:   Procedure Laterality Date    IRRIGATION & DEBRIDEMENT ORTHO Right 9/29/2015    Procedure: I & D RIGHT INDEX FINGER;  Surgeon: Neal Nolen M.D.;  Location: SURGERY [facility];  Service:        Family History   Problem Relation Age of Onset    Thyroid Mother     Alzheimer's Disease Father     ADD / ADHD Father   "   Asthma Brother     Alzheimer's Disease Maternal Grandmother     Arthritis Maternal Grandfather     Alzheimer's Disease Paternal Grandmother     No Known Problems Paternal Grandfather        Social History     Tobacco Use   Smoking Status Never   Smokeless Tobacco Never       Social History     Substance and Sexual Activity   Alcohol Use Not Currently    Alcohol/week: 2.4 oz    Types: 4 Glasses of wine per week       Review of systems  As per HPI above. All other systems reviewed and negative.      Past Medical, Social, and Family history reviewed and updated in [facility]       LAB DATA:     I have independently reviewed / interpreted labs    Lab Results   Component Value Date/Time    HBA1C 5.3 01/13/2024 09:09 AM    HBA1C 6.0 (H) 11/11/2023 10:23 AM    HBA1C 5.5 10/26/2022 02:08 PM        Lab Results   Component Value Date/Time    WBC 6.5 11/11/2023 10:23 AM    HEMOGLOBIN 15.0 11/11/2023 10:23 AM    HEMATOCRIT 45.1 11/11/2023 10:23 AM    MCV 89.5 11/11/2023 10:23 AM    PLATELETCT 298 11/11/2023 10:23 AM       Lab Results   Component Value Date/Time    SODIUM 139 01/13/2024 09:09 AM    POTASSIUM 4.7 01/13/2024 09:09 AM    GLUCOSE 105 (H) 01/13/2024 09:09 AM    BUN 14 01/13/2024 09:09 AM    CREATININE 0.85 01/13/2024 09:09 AM       Lab Results   Component Value Date/Time    CHOLSTRLTOT 226 (H) 01/13/2024 09:09 AM    TRIGLYCERIDE 41 01/13/2024 09:09 AM    HDL 66 01/13/2024 09:09 AM     (H) 01/13/2024 09:09 AM       Lab Results   Component Value Date/Time    ALTSGPT 22 04/22/2021 09:40 AM          Objective     /60 (BP Location: Right arm, Patient Position: Sitting, BP Cuff Size: Large adult)   Pulse 66   Temp 36.4 °C (97.5 °F) (Temporal)   Resp 16   Ht 1.88 m (6' 2\")   Wt 92.3 kg (203 lb 6.4 oz)   SpO2 98%    Body mass index is 26.12 kg/m².    General: alert in no apparent distress.  Cardiovascular: regular rate and rhythm  Pulmonary: lungs : no wheezing   Gastrointestinal: BS present.    normal male " genitalia.  No urethral discharge.  Testes descended.  No obvious mass or acute abnormality noted on exam    Assessment and Plan     1. Attention deficit hyperactivity disorder (ADHD), unspecified ADHD type  Chronic stable condition.  Refill Adderall.      - amphetamine-dextroamphetamine (ADDERALL, 20MG,) 20 MG Tab; Take 1 Tablet by mouth 2 times a day for 30 days.  Dispense: 60 Tablet; Refill: 0      2. Dyslipidemia  Chronic condition.  Current status unclear.  Lab test requested check lipid panel.  - ALANINE AMINO-TRANS; Future  - Lipid Profile; Future    3. Prediabetes  Chronic condition.  Current status unclear.  Lab test ordered to check A1c.  Continue with diet and lifestyle modification  - HEMOGLOBIN A1C; Future  - Basic Metabolic Panel; Future    4. Allergic rhinitis due to pollen, unspecified seasonality  Chronic stable.  Continue Astelin nasal spray.  Patient may take over-the-counter Zyrtec 10 mg daily as needed    5. Vitamin D deficiency  - VITAMIN D,25 HYDROXY (DEFICIENCY); Future  Chronic condition.  Current status unclear.  Lab test ordered for follow-up.    6. Chronic fatigue  Chronic condition.  Cause unclear.  Exam today unremarkable.  Further  Investigation recommended including lab test CBC TSH B12 and testosterone.  Recommend follow-up after lab test done.    7. Dysphagia, unspecified type    Chronic condition.  Cause unclear.  Exam today unremarkable.  Upper GI requested.  Follow-up after imaging done     8.  Testicular exam today unremarkable.  Patient was reassured.      Recommend follow-up in approx 4 months      Attestation: I spent:   43  minutes -    That includes time for chart review before the visit, the actual patient visit, and time spent on documentation in the EMR after the visit.  Chart review/prep, review of other providers' records, Independent review of imagings/labs , face-to-face time for history/examination, pt's counseling/education, ordering, prescribing, treatment plan  discussed with patient, and care coordination.            Please note that this dictation was created using voice recognition software. I have made every reasonable attempt to correct obvious errors, but I expect that there are errors of grammar and possibly content that I did not discover before finalizing the note.    Geoffrey Cline MD  Internal Medicine  [facility]

## 2024-08-29 NOTE — ASSESSMENT & PLAN NOTE
This is a new condition.  The patient reported some discomfort in the epigastric region after eating/swallowing.  He denies fever or chills.  Patient also stated that he has been working out on regular basis.  Currently the patient asymptomatic.